# Patient Record
Sex: FEMALE | Race: WHITE | NOT HISPANIC OR LATINO | Employment: FULL TIME | URBAN - METROPOLITAN AREA
[De-identification: names, ages, dates, MRNs, and addresses within clinical notes are randomized per-mention and may not be internally consistent; named-entity substitution may affect disease eponyms.]

---

## 2023-03-10 LAB
THYROTROPIN (MIU/L) IN SER/PLAS BY DETECTION LIMIT <= 0.05 MIU/L: 2.77 MIU/L (ref 0.44–3.98)
THYROXINE (T4) FREE (NG/DL) IN SER/PLAS: 0.94 NG/DL (ref 0.61–1.12)

## 2023-04-17 PROBLEM — N18.9 CKD (CHRONIC KIDNEY DISEASE): Status: ACTIVE | Noted: 2023-04-17

## 2023-04-17 PROBLEM — D24.2 INTRADUCTAL PAPILLOMA OF LEFT BREAST: Status: ACTIVE | Noted: 2023-04-17

## 2023-04-17 PROBLEM — F41.9 ANXIETY: Status: ACTIVE | Noted: 2023-04-17

## 2023-04-17 PROBLEM — N63.20 BREAST MASS, LEFT: Status: ACTIVE | Noted: 2023-04-17

## 2023-04-17 PROBLEM — B00.9 HSV-1 (HERPES SIMPLEX VIRUS 1) INFECTION: Status: ACTIVE | Noted: 2023-04-17

## 2023-04-17 PROBLEM — N39.41 URGE INCONTINENCE OF URINE: Status: ACTIVE | Noted: 2023-04-17

## 2023-04-17 PROBLEM — R80.9 ASYMPTOMATIC PROTEINURIA: Status: ACTIVE | Noted: 2023-04-17

## 2023-04-17 PROBLEM — D36.9 INTRADUCTAL PAPILLOMA: Status: ACTIVE | Noted: 2023-04-17

## 2023-04-17 PROBLEM — J30.9 ALLERGIC RHINITIS: Status: ACTIVE | Noted: 2023-04-17

## 2023-04-17 PROBLEM — D17.1 LIPOMA OF BACK: Status: ACTIVE | Noted: 2023-04-17

## 2023-04-17 PROBLEM — J45.20 MILD INTERMITTENT EXTRINSIC ASTHMA (HHS-HCC): Status: ACTIVE | Noted: 2023-04-17

## 2023-04-17 PROBLEM — G89.29 CHRONIC PAIN: Status: ACTIVE | Noted: 2023-04-17

## 2023-04-17 PROBLEM — J45.990 EXERCISE-INDUCED BRONCHOSPASM (HHS-HCC): Status: ACTIVE | Noted: 2023-04-17

## 2023-04-17 PROBLEM — S05.00XA CONJUNCTIVAL ABRASION: Status: ACTIVE | Noted: 2023-04-17

## 2023-04-17 PROBLEM — R76.8 POSITIVE ANA (ANTINUCLEAR ANTIBODY): Status: ACTIVE | Noted: 2023-04-17

## 2023-04-17 PROBLEM — M19.90 OSTEOARTHRITIS: Status: ACTIVE | Noted: 2023-04-17

## 2023-04-17 PROBLEM — E55.9 VITAMIN D DEFICIENCY: Status: ACTIVE | Noted: 2023-04-17

## 2023-04-17 PROBLEM — L81.9 ATYPICAL PIGMENTED SKIN LESION: Status: ACTIVE | Noted: 2023-04-17

## 2023-04-17 PROBLEM — R79.89 ABNORMAL THYROID SCREEN (BLOOD): Status: ACTIVE | Noted: 2023-04-17

## 2023-04-17 PROBLEM — E03.9 HYPOTHYROIDISM: Status: ACTIVE | Noted: 2023-04-17

## 2023-04-17 PROBLEM — R92.8 ABNORMAL MAMMOGRAM: Status: ACTIVE | Noted: 2023-04-17

## 2023-04-17 PROBLEM — I49.3 ASYMPTOMATIC PVCS: Status: ACTIVE | Noted: 2023-04-17

## 2023-04-17 PROBLEM — E78.5 HYPERLIPIDEMIA: Status: ACTIVE | Noted: 2023-04-17

## 2023-04-17 PROBLEM — I10 ESSENTIAL HYPERTENSION: Status: ACTIVE | Noted: 2023-04-17

## 2023-04-17 PROBLEM — Z98.84 S/P BARIATRIC SURGERY: Status: ACTIVE | Noted: 2023-04-17

## 2023-04-17 PROBLEM — N64.52 NIPPLE DISCHARGE: Status: ACTIVE | Noted: 2023-04-17

## 2023-04-17 RX ORDER — ALBUTEROL SULFATE 90 UG/1
2 AEROSOL, METERED RESPIRATORY (INHALATION) EVERY 6 HOURS PRN
COMMUNITY
Start: 2020-04-10 | End: 2023-06-02 | Stop reason: ALTCHOICE

## 2023-04-17 RX ORDER — MONTELUKAST SODIUM 10 MG/1
1 TABLET ORAL DAILY
COMMUNITY
Start: 2020-04-10 | End: 2023-04-20 | Stop reason: SDUPTHER

## 2023-04-17 RX ORDER — SERTRALINE HYDROCHLORIDE 50 MG/1
1 TABLET, FILM COATED ORAL DAILY
COMMUNITY
Start: 2019-08-07 | End: 2023-04-20 | Stop reason: SDUPTHER

## 2023-04-17 RX ORDER — ACETAMINOPHEN 500 MG
1 TABLET ORAL DAILY
COMMUNITY

## 2023-04-17 RX ORDER — OXYCODONE AND ACETAMINOPHEN 5; 325 MG/1; MG/1
1 TABLET ORAL
COMMUNITY
Start: 2022-05-25 | End: 2023-06-02 | Stop reason: ALTCHOICE

## 2023-04-17 RX ORDER — LEVOTHYROXINE SODIUM 175 UG/1
1 TABLET ORAL DAILY
COMMUNITY
Start: 2020-04-10 | End: 2023-06-02 | Stop reason: ALTCHOICE

## 2023-04-17 RX ORDER — LISINOPRIL 10 MG/1
10 TABLET ORAL DAILY
COMMUNITY
Start: 2022-05-03 | End: 2023-06-02 | Stop reason: ALTCHOICE

## 2023-04-17 RX ORDER — ONDANSETRON 4 MG/1
8 TABLET, FILM COATED ORAL EVERY 8 HOURS PRN
COMMUNITY
Start: 2022-05-25 | End: 2023-06-02 | Stop reason: ALTCHOICE

## 2023-04-17 RX ORDER — VALACYCLOVIR HYDROCHLORIDE 1 G/1
1000 TABLET, FILM COATED ORAL DAILY
COMMUNITY
End: 2023-05-14

## 2023-04-17 RX ORDER — URSODIOL 300 MG/1
CAPSULE ORAL
COMMUNITY
End: 2023-06-02 | Stop reason: ALTCHOICE

## 2023-04-17 RX ORDER — OMEPRAZOLE 20 MG/1
20 CAPSULE, DELAYED RELEASE ORAL
COMMUNITY
End: 2023-06-02 | Stop reason: ALTCHOICE

## 2023-04-17 RX ORDER — ATORVASTATIN CALCIUM 20 MG/1
20 TABLET, FILM COATED ORAL
COMMUNITY
Start: 2022-05-03 | End: 2023-06-02 | Stop reason: ALTCHOICE

## 2023-04-17 RX ORDER — NYSTATIN 100000 [USP'U]/ML
SUSPENSION ORAL
COMMUNITY
Start: 2022-05-31 | End: 2023-06-02 | Stop reason: ALTCHOICE

## 2023-04-17 RX ORDER — ACETAMINOPHEN, DEXTROMETHORPHAN HBR, DOXYLAMINE SUCCINATE, PHENYLEPHRINE HCL 650; 20; 12.5; 1 MG/30ML; MG/30ML; MG/30ML; MG/30ML
1 SOLUTION ORAL DAILY
COMMUNITY

## 2023-04-20 DIAGNOSIS — J30.9 ALLERGIC RHINITIS, UNSPECIFIED SEASONALITY, UNSPECIFIED TRIGGER: Primary | ICD-10-CM

## 2023-04-20 DIAGNOSIS — F41.9 ANXIETY: ICD-10-CM

## 2023-04-20 NOTE — TELEPHONE ENCOUNTER
Pt's apt with [facility] was rescheduled this week. It is now on 6/2, but pt will run out of Montelukast and Sertraline 50mg AND 25mg before then and needs those to [facility] st[facility]?

## 2023-04-21 ENCOUNTER — APPOINTMENT (OUTPATIENT)
Dept: PRIMARY CARE | Facility: CLINIC | Age: 63
End: 2023-04-21
Payer: COMMERCIAL

## 2023-04-21 RX ORDER — SERTRALINE HYDROCHLORIDE 50 MG/1
50 TABLET, FILM COATED ORAL DAILY
Qty: 30 TABLET | Refills: 0 | Status: SHIPPED | OUTPATIENT
Start: 2023-04-21 | End: 2023-06-02 | Stop reason: SDUPTHER

## 2023-04-21 RX ORDER — MONTELUKAST SODIUM 10 MG/1
10 TABLET ORAL DAILY
Qty: 30 TABLET | Refills: 0 | Status: SHIPPED | OUTPATIENT
Start: 2023-04-21 | End: 2023-06-02 | Stop reason: SDUPTHER

## 2023-05-11 DIAGNOSIS — B00.9 HSV-1 (HERPES SIMPLEX VIRUS 1) INFECTION: Primary | ICD-10-CM

## 2023-05-12 LAB
THYROTROPIN (MIU/L) IN SER/PLAS BY DETECTION LIMIT <= 0.05 MIU/L: 0.13 MIU/L (ref 0.44–3.98)
THYROXINE (T4) FREE (NG/DL) IN SER/PLAS: 1.29 NG/DL (ref 0.61–1.12)

## 2023-05-14 RX ORDER — VALACYCLOVIR HYDROCHLORIDE 1 G/1
TABLET, FILM COATED ORAL
Qty: 30 TABLET | Refills: 0 | Status: SHIPPED | OUTPATIENT
Start: 2023-05-14

## 2023-06-02 ENCOUNTER — OFFICE VISIT (OUTPATIENT)
Dept: PRIMARY CARE | Facility: CLINIC | Age: 63
End: 2023-06-02
Payer: COMMERCIAL

## 2023-06-02 VITALS
TEMPERATURE: 96.8 F | HEIGHT: 64 IN | HEART RATE: 70 BPM | DIASTOLIC BLOOD PRESSURE: 52 MMHG | WEIGHT: 150 LBS | SYSTOLIC BLOOD PRESSURE: 109 MMHG | OXYGEN SATURATION: 95 % | BODY MASS INDEX: 25.61 KG/M2

## 2023-06-02 DIAGNOSIS — Z00.00 HEALTHCARE MAINTENANCE: ICD-10-CM

## 2023-06-02 DIAGNOSIS — J30.9 ALLERGIC RHINITIS, UNSPECIFIED SEASONALITY, UNSPECIFIED TRIGGER: ICD-10-CM

## 2023-06-02 DIAGNOSIS — F41.9 ANXIETY: ICD-10-CM

## 2023-06-02 DIAGNOSIS — Z12.11 COLON CANCER SCREENING: ICD-10-CM

## 2023-06-02 DIAGNOSIS — Z78.0 POST-MENOPAUSAL: Primary | ICD-10-CM

## 2023-06-02 PROBLEM — R92.8 ABNORMAL MAMMOGRAM: Status: RESOLVED | Noted: 2023-04-17 | Resolved: 2023-06-02

## 2023-06-02 PROBLEM — N63.20 BREAST MASS, LEFT: Status: RESOLVED | Noted: 2023-04-17 | Resolved: 2023-06-02

## 2023-06-02 PROBLEM — D36.9 INTRADUCTAL PAPILLOMA: Status: RESOLVED | Noted: 2023-04-17 | Resolved: 2023-06-02

## 2023-06-02 PROBLEM — L81.9 ATYPICAL PIGMENTED SKIN LESION: Status: RESOLVED | Noted: 2023-04-17 | Resolved: 2023-06-02

## 2023-06-02 PROCEDURE — 3078F DIAST BP <80 MM HG: CPT

## 2023-06-02 PROCEDURE — 99396 PREV VISIT EST AGE 40-64: CPT

## 2023-06-02 PROCEDURE — 3074F SYST BP LT 130 MM HG: CPT

## 2023-06-02 PROCEDURE — 1036F TOBACCO NON-USER: CPT

## 2023-06-02 RX ORDER — LACTOBACILLUS COMBINATION NO.4 3B CELL
CAPSULE ORAL
COMMUNITY

## 2023-06-02 RX ORDER — IBUPROFEN 200 MG
500 CAPSULE ORAL
COMMUNITY

## 2023-06-02 RX ORDER — MONTELUKAST SODIUM 10 MG/1
10 TABLET ORAL DAILY
Qty: 90 TABLET | Refills: 3 | Status: SHIPPED | OUTPATIENT
Start: 2023-06-02 | End: 2024-06-07 | Stop reason: SDUPTHER

## 2023-06-02 RX ORDER — SERTRALINE HYDROCHLORIDE 50 MG/1
50 TABLET, FILM COATED ORAL DAILY
Qty: 90 TABLET | Refills: 3 | Status: SHIPPED | OUTPATIENT
Start: 2023-06-02 | End: 2023-12-18 | Stop reason: SDUPTHER

## 2023-06-02 RX ORDER — LEVOTHYROXINE SODIUM 150 UG/1
150 TABLET ORAL
COMMUNITY

## 2023-06-02 ASSESSMENT — ENCOUNTER SYMPTOMS
HEMATOLOGIC/LYMPHATIC NEGATIVE: 1
CARDIOVASCULAR NEGATIVE: 1
ENDOCRINE NEGATIVE: 1
PSYCHIATRIC NEGATIVE: 1
MUSCULOSKELETAL NEGATIVE: 1
CONSTITUTIONAL NEGATIVE: 1
GASTROINTESTINAL NEGATIVE: 1
EYES NEGATIVE: 1
NEUROLOGICAL NEGATIVE: 1
RESPIRATORY NEGATIVE: 1

## 2023-06-02 ASSESSMENT — PAIN SCALES - GENERAL: PAINLEVEL: 0-NO PAIN

## 2023-06-02 NOTE — PROGRESS NOTES
Subjective   Patient ID: Marielena Vides is a 62 y.o. female who presents for visit to [facility] care.      Diet: Healthy diet , eating a little bit of fruit and veggies before grains, 5-6 small meals through the day, mostly protein and veggies and fruit. No soda. Drinks coffee, water, tea  Exercise: Very active, routinely getting 02037+ steps per day  Weight: Down 80lbs planned  Water: Drinking about 64+oz fluid per day  Sleep: OK sleep, getting about 7-9 hours per night  Social: , her oldest son and 14 year old grandson with autism live with her in a raised ranch, 2 dogs, 3 chickens  Professional: Works as  at special needs     Review of Systems   Constitutional: Negative.    HENT: Negative.     Eyes: Negative.    Respiratory: Negative.     Cardiovascular: Negative.    Gastrointestinal: Negative.    Endocrine: Negative.    Genitourinary: Negative.    Musculoskeletal: Negative.    Skin: Negative.    Neurological: Negative.    Hematological: Negative.    Psychiatric/Behavioral: Negative.          Current Outpatient Medications   Medication Sig Dispense Refill    calcium citrate (Calcitrate) 200 mg (950 mg) tablet Take 2.5 tablets (500 mg) by mouth once daily.      cholecalciferol (Vitamin D-3) 5,000 Units tablet Take 1 tablet (5,000 Units) by mouth once daily.      cyanocobalamin, vitamin B-12, (Vitamin B-12) 1,000 mcg tablet extended release Take 1,000 mcg by mouth once daily.      lactobacillus combination no.4 (Probiotic) 3 billion cell capsule as directed      levothyroxine (Synthroid, Levoxyl) 150 mcg tablet Take 1 tablet (150 mcg) by mouth once daily in the morning. Take before meals.      multivitamin tablet,chewable Chew 2 tablets once daily. Flintstones vitamin with iron      valACYclovir (Valtrex) 1 gram tablet TAKE AS DIRECTED  IF NEEDED. 30 tablet 0    montelukast (Singulair) 10 mg tablet Take 1 tablet (10 mg) by mouth once daily. 90 tablet 3    sertraline (Zoloft) 50 mg  "tablet Take 1 tablet (50 mg) by mouth once daily. 90 tablet 3     No current facility-administered medications for this visit.     Past Surgical History:   Procedure Laterality Date    OTHER SURGICAL HISTORY  2021    Appendectomy    OTHER SURGICAL HISTORY  2021    Cyst excision    OTHER SURGICAL HISTORY  2021    Tonsillectomy    OTHER SURGICAL HISTORY  2021    Oral surgery    OTHER SURGICAL HISTORY  2021     section    OTHER SURGICAL HISTORY  2021    Carpal tunnel surgery    OTHER SURGICAL HISTORY  2021    Ovarian cystectomy    OTHER SURGICAL HISTORY  2021    Elbow surgery    OTHER SURGICAL HISTORY  2022    Gastric bypass surgery     No family history on file.   Social History     Tobacco Use    Smoking status: Never    Smokeless tobacco: Never   Vaping Use    Vaping status: Never Used   Substance Use Topics    Alcohol use: Never    Drug use: Never        Objective     Visit Vitals  /52 (BP Location: Left arm, Patient Position: Sitting, BP Cuff Size: Small adult)   Pulse 70   Temp 36 °C (96.8 °F)   Ht 1.618 m (5' 3.7\")   Wt 68 kg (150 lb)   SpO2 95%   BMI 25.99 kg/m²   Smoking Status Never   BSA 1.75 m²        Physical Exam  Constitutional:       Appearance: Normal appearance.   HENT:      Head: Normocephalic and atraumatic.   Eyes:      Extraocular Movements: Extraocular movements intact.      Pupils: Pupils are equal, round, and reactive to light.   Cardiovascular:      Rate and Rhythm: Normal rate and regular rhythm.   Pulmonary:      Effort: Pulmonary effort is normal.      Breath sounds: Normal breath sounds.   Abdominal:      General: Abdomen is flat. Bowel sounds are normal.      Palpations: Abdomen is soft.   Musculoskeletal:         General: Normal range of motion.   Neurological:      General: No focal deficit present.      Mental Status: She is alert and oriented to person, place, and time.   Psychiatric:         Mood and Affect: Mood " normal.         Behavior: Behavior normal.           Assessment/Plan   Problem List Items Addressed This Visit       Allergic rhinitis    Relevant Medications    montelukast (Singulair) 10 mg tablet    Anxiety    Relevant Medications    sertraline (Zoloft) 50 mg tablet     Other Visit Diagnoses       Post-menopausal    -  Primary    Relevant Orders    XR DEXA bone density    Colon cancer screening        Relevant Orders    Colonoscopy    Healthcare maintenance        Relevant Orders    Follow Up In Primary Care            All pertinent lab work and results were reviewed with patient.     Follow up with me 6-12 months    Vivian Cronin, CHINO-CNS

## 2023-06-02 NOTE — PATIENT INSTRUCTIONS
Thank you for coming to see me today.  If you have any questions or concerns following our visit, please contact the office.  Phone: [phone]    Follow up with me in 6-12 months or sooner as needed.   If seeing bariatric surgery in 6 months come see me in 12 months; if you see them in 12 months come see me in 6 months with labs before visit    1) Please schedule a bone density scan and colonoscopy - please call [phone] or stop to 's office (in the lab office) on your way out today.     2) Look into shingrix vaccine from local pharmacy; 2 shots given 2-6 months apart, check with pharmacy

## 2023-07-10 LAB
THYROTROPIN (MIU/L) IN SER/PLAS BY DETECTION LIMIT <= 0.05 MIU/L: 0.15 MIU/L (ref 0.44–3.98)
THYROXINE (T4) FREE (NG/DL) IN SER/PLAS: 1.31 NG/DL (ref 0.61–1.12)

## 2023-09-08 LAB — THYROTROPIN (MIU/L) IN SER/PLAS BY DETECTION LIMIT <= 0.05 MIU/L: 0.51 MIU/L (ref 0.44–3.98)

## 2023-11-03 ENCOUNTER — OFFICE VISIT (OUTPATIENT)
Dept: DERMATOLOGY | Facility: CLINIC | Age: 63
End: 2023-11-03
Payer: COMMERCIAL

## 2023-11-03 DIAGNOSIS — L82.1 SEBORRHEIC KERATOSIS: ICD-10-CM

## 2023-11-03 DIAGNOSIS — L91.8 SKIN TAG: ICD-10-CM

## 2023-11-03 DIAGNOSIS — Z12.83 ENCOUNTER FOR SCREENING FOR MALIGNANT NEOPLASM OF SKIN: Primary | ICD-10-CM

## 2023-11-03 DIAGNOSIS — L81.4 LENTIGO: ICD-10-CM

## 2023-11-03 DIAGNOSIS — D18.01 HEMANGIOMA OF SKIN: ICD-10-CM

## 2023-11-03 PROCEDURE — 99213 OFFICE O/P EST LOW 20 MIN: CPT | Performed by: NURSE PRACTITIONER

## 2023-11-03 PROCEDURE — 3074F SYST BP LT 130 MM HG: CPT | Performed by: NURSE PRACTITIONER

## 2023-11-03 PROCEDURE — 3078F DIAST BP <80 MM HG: CPT | Performed by: NURSE PRACTITIONER

## 2023-11-03 PROCEDURE — 1036F TOBACCO NON-USER: CPT | Performed by: NURSE PRACTITIONER

## 2023-11-03 NOTE — PROGRESS NOTES
Subjective     Marielena Vides is a 63 y.o. female who presents for the following: Skin Check (Pt requests full body skin exam. No complaints.).     Review of Systems:  No other skin or systemic complaints other than what is documented elsewhere in the note.    The following portions of the chart were reviewed this encounter and updated as appropriate:  Tobacco  Allergies  Meds  Problems  Med Hx  Surg Hx  Fam Hx         Skin Cancer History  No skin cancer on file.      Specialty Problems    None       Objective   Well appearing patient in no apparent distress; mood and affect are within normal limits.    A full examination was performed including scalp, head, eyes, ears, nose, lips, neck, chest, axillae, abdomen, back, buttocks, bilateral upper extremities, bilateral lower extremities, hands, feet, fingers, toes, fingernails, and toenails. All findings within normal limits unless otherwise noted below.    Assessment/Plan   1. Encounter for screening for malignant neoplasm of skin  Scattered benign lesions    - Protective measures, such as avoiding skin exposure to sunlight during peak sun hours (10 AM to 3 PM), wearing protective clothing, and applying high-SPF sunscreen, are essential for reducing exposure to harmful ultraviolet (UV) light.  - Monthly self-examination of the skin is helpful to detect new lesions or changes in existing lesions.  - Discussed signs and symptoms of sun-related skin cancers.   - Make sure your moles are not signs of skin cancer (melanoma). Remember the ABCDEs of melanoma lesions:  A - Asymmetry: One half of the lesion does not mirror the other half.  B - Border: The borders are irregular or vague (indistinct).  C - Color: More than one color may be noted within the mole.  D - Diameter: Size greater than 6 mm (roughly the size of a pencil eraser) may be concerning.  E - Evolving: Notable changes in the lesion over time are suspicious signs for skin cancer.    Related  Procedures  Follow Up In Dermatology - Established Patient    2. Seborrheic keratosis  Stuck on verrucous, tan-brown papules and plaques.      Although Seborrheic Keratoses can be troublesome and unsightly, they are entirely benign.  Removal of Seborrheic Keratoses is considered a cosmetic procedure. Removal is typically performed using liquid nitrogen cryotherapy.  Treatment of current lesions does not prevent the development of new Seborrheic Keratoses in the future.    3. Skin tag  Fleshy, skin-colored sessile and pedunculated papules.     Acrochordon (skin tag)  - The benign nature of the lesion was discussed with patient.   - A skin tag (acrochordon) is a common, possibly inherited condition that manifests as small, flesh-colored growths on a thin stalk. Skin tags are benign lesions that can sometimes become irritated or traumatized.  - Skin tags are very common, and their incidence increases with age. Seen more often in people with growth hormone excess (acromegaly).   - Skin tags are most commonly found on the eyelids, neck, armpits, and groin area. They are flesh-colored growths on a thin stalk, ranging in size from small to large.      4. Hemangioma of skin  Violaceous/red papule with maroon lagoons     - A cherry hemangioma is a small macule (small, flat, smooth area) or papule (small, solid bump) formed from an overgrowth of tiny blood vessels in the skin. Cherry hemangiomas are characteristically red or purplish in color. They often first appear in middle adulthood and usually increase in number with age. Cherry hemangiomas are noncancerous (benign) and are common in adults.  - Lesions are benign, reassured patient.     5. Lentigo  Scattered tan macules in sun-exposed areas.    A solar lentigo (plural, solar lentigines), sometimes called an age spot or liver spot, is a brown macule (small, flat, smooth area of skin) caused by chronic sun or artificial ultraviolet (UV) light exposure. There may be just  one lentigo or there may be multiple. This type of lentigo is different from lentigo simplex (discussed separately) because it is caused by exposure to UV light. Solar lentigines are benign, but they do indicate excessive sun exposure, a risk factor for the development of skin cancer.  Lesions are benign, no treatment needed.

## 2023-12-14 ENCOUNTER — PATIENT MESSAGE (OUTPATIENT)
Dept: PRIMARY CARE | Facility: CLINIC | Age: 63
End: 2023-12-14
Payer: COMMERCIAL

## 2023-12-14 DIAGNOSIS — F41.9 ANXIETY: ICD-10-CM

## 2023-12-18 RX ORDER — SERTRALINE HYDROCHLORIDE 50 MG/1
50 TABLET, FILM COATED ORAL DAILY
Qty: 90 TABLET | Refills: 3 | Status: SHIPPED | OUTPATIENT
Start: 2023-12-18 | End: 2024-06-07 | Stop reason: SDUPTHER

## 2023-12-18 RX ORDER — SERTRALINE HYDROCHLORIDE 25 MG/1
25 TABLET, FILM COATED ORAL DAILY
Qty: 90 TABLET | Refills: 3 | Status: SHIPPED | OUTPATIENT
Start: 2023-12-18 | End: 2024-06-07 | Stop reason: SDUPTHER

## 2024-01-31 ENCOUNTER — PATIENT MESSAGE (OUTPATIENT)
Dept: PRIMARY CARE | Facility: CLINIC | Age: 64
End: 2024-01-31
Payer: COMMERCIAL

## 2024-01-31 DIAGNOSIS — N39.41 URGE INCONTINENCE OF URINE: Primary | ICD-10-CM

## 2024-02-01 RX ORDER — MIRABEGRON 25 MG/1
25 TABLET, FILM COATED, EXTENDED RELEASE ORAL DAILY
Qty: 30 TABLET | Refills: 11 | Status: SHIPPED | OUTPATIENT
Start: 2024-02-01 | End: 2024-06-07 | Stop reason: SDUPTHER

## 2024-02-29 ENCOUNTER — OFFICE VISIT (OUTPATIENT)
Dept: OBSTETRICS AND GYNECOLOGY | Facility: CLINIC | Age: 64
End: 2024-02-29
Payer: COMMERCIAL

## 2024-02-29 VITALS
BODY MASS INDEX: 25.61 KG/M2 | DIASTOLIC BLOOD PRESSURE: 60 MMHG | HEIGHT: 64 IN | WEIGHT: 150 LBS | SYSTOLIC BLOOD PRESSURE: 118 MMHG

## 2024-02-29 DIAGNOSIS — Z11.51 SCREENING FOR HUMAN PAPILLOMAVIRUS (HPV): ICD-10-CM

## 2024-02-29 DIAGNOSIS — Z12.4 PAP SMEAR FOR CERVICAL CANCER SCREENING: ICD-10-CM

## 2024-02-29 DIAGNOSIS — Z01.419 WOMEN'S ANNUAL ROUTINE GYNECOLOGICAL EXAMINATION: Primary | ICD-10-CM

## 2024-02-29 PROCEDURE — 3074F SYST BP LT 130 MM HG: CPT | Performed by: NURSE PRACTITIONER

## 2024-02-29 PROCEDURE — 3078F DIAST BP <80 MM HG: CPT | Performed by: NURSE PRACTITIONER

## 2024-02-29 PROCEDURE — 88175 CYTOPATH C/V AUTO FLUID REDO: CPT

## 2024-02-29 PROCEDURE — 87624 HPV HI-RISK TYP POOLED RSLT: CPT

## 2024-02-29 PROCEDURE — 1036F TOBACCO NON-USER: CPT | Performed by: NURSE PRACTITIONER

## 2024-02-29 PROCEDURE — 99396 PREV VISIT EST AGE 40-64: CPT | Performed by: NURSE PRACTITIONER

## 2024-02-29 ASSESSMENT — ENCOUNTER SYMPTOMS
NEUROLOGICAL NEGATIVE: 1
PSYCHIATRIC NEGATIVE: 1
EYES NEGATIVE: 1
CONSTITUTIONAL NEGATIVE: 1
RESPIRATORY NEGATIVE: 1
MUSCULOSKELETAL NEGATIVE: 1
ENDOCRINE NEGATIVE: 1
GASTROINTESTINAL NEGATIVE: 1
CARDIOVASCULAR NEGATIVE: 1

## 2024-02-29 NOTE — PROGRESS NOTES
Subjective   Patient ID: Marielena Vides is a 63 y.o. female who presents for Annual Exam (Normal PAP /HPV 12/09/2021/Bone Density 6/2/2023. /Mammogram done 4/20/2023).  63 year old here for annual exam without complaints.  She is due for her pap today as her las tpap was neg with neg hpv in 2021.  She is due for her mammogram in April 2024.  She denies any bleeding, pain, discharge, urinary changes and breast complaints.         Review of Systems   Constitutional: Negative.    HENT: Negative.     Eyes: Negative.    Respiratory: Negative.     Cardiovascular: Negative.    Gastrointestinal: Negative.    Endocrine: Negative.    Genitourinary: Negative.    Musculoskeletal: Negative.    Skin: Negative.    Neurological: Negative.    Psychiatric/Behavioral: Negative.         Objective   Physical Exam  Vitals reviewed.   Constitutional:       Appearance: Normal appearance. She is well-developed.   Pulmonary:      Effort: Pulmonary effort is normal. No respiratory distress.   Chest:   Breasts:     Breasts are symmetrical.      Right: Normal. No swelling, bleeding, inverted nipple, mass, nipple discharge, skin change or tenderness.      Left: Normal. No swelling, bleeding, inverted nipple, mass, nipple discharge, skin change or tenderness.   Abdominal:      Palpations: Abdomen is soft.   Genitourinary:     General: Normal vulva.      Exam position: Lithotomy position.      Pubic Area: No rash.       Labia:         Right: No rash, tenderness, lesion or injury.         Left: No rash, tenderness, lesion or injury.       Urethra: No prolapse, urethral pain, urethral swelling or urethral lesion.      Vagina: Normal.      Cervix: Normal.      Uterus: Normal. With uterine prolapse.       Adnexa: Right adnexa normal and left adnexa normal.      Rectum: Normal.   Musculoskeletal:         General: Normal range of motion.   Lymphadenopathy:      Upper Body:      Right upper body: No supraclavicular, axillary or pectoral adenopathy.       Left upper body: No supraclavicular, axillary or pectoral adenopathy.   Skin:     General: Skin is warm and dry.   Neurological:      General: No focal deficit present.      Mental Status: She is alert and oriented to person, place, and time. Mental status is at baseline.   Psychiatric:         Attention and Perception: Attention and perception normal.         Mood and Affect: Mood and affect normal.         Speech: Speech normal.         Behavior: Behavior normal. Behavior is cooperative.         Thought Content: Thought content normal.         Judgment: Judgment normal.         Assessment/Plan   Problem List Items Addressed This Visit             ICD-10-CM    Women's annual routine gynecological examination - Primary Z01.419     Other Visit Diagnoses         Codes    Pap smear for cervical cancer screening     Z12.4    Relevant Orders    THINPREP PAP TEST    Screening for human papillomavirus (HPV)     Z11.51    Relevant Orders    THINPREP PAP TEST          Pap/hpv sent  Mammogram scheduled  Follow up 1 year or as needed       CHINO Ann-CNP 02/29/24 9:23 AM

## 2024-03-12 ENCOUNTER — HOSPITAL ENCOUNTER (OUTPATIENT)
Dept: RADIOLOGY | Facility: EXTERNAL LOCATION | Age: 64
Discharge: HOME | End: 2024-03-12
Payer: COMMERCIAL

## 2024-03-12 DIAGNOSIS — M79.671 PAIN IN RIGHT FOOT: ICD-10-CM

## 2024-03-12 LAB
CYTOLOGY CMNT CVX/VAG CYTO-IMP: NORMAL
HPV HR 12 DNA GENITAL QL NAA+PROBE: NEGATIVE
HPV HR GENOTYPES PNL CVX NAA+PROBE: NEGATIVE
HPV16 DNA SPEC QL NAA+PROBE: NEGATIVE
HPV18 DNA SPEC QL NAA+PROBE: NEGATIVE
LAB AP HPV GENOTYPE QUESTION: YES
LAB AP HPV HR: NORMAL
LABORATORY COMMENT REPORT: NORMAL
PATH REPORT.TOTAL CANCER: NORMAL

## 2024-03-28 ENCOUNTER — PATIENT MESSAGE (OUTPATIENT)
Dept: ENDOCRINOLOGY | Facility: CLINIC | Age: 64
End: 2024-03-28
Payer: COMMERCIAL

## 2024-03-28 DIAGNOSIS — R79.89 ABNORMAL THYROID SCREEN (BLOOD): Primary | ICD-10-CM

## 2024-03-29 NOTE — TELEPHONE ENCOUNTER
From: Marielena Vides  To: Kimmie Kramer, APRN-CNP  Sent: 3/28/2024 8:10 PM EDT  Subject: Bloodwork    Hello, I thought that we had agreed to a check of my TSH levels about now. However, there are no orders in the  systems for the bloodwork.     Do you want to monitor my thyroid levels before the next appt. In Sept.?    Sincerely,   Marielena Vides

## 2024-04-01 ENCOUNTER — LAB (OUTPATIENT)
Dept: LAB | Facility: LAB | Age: 64
End: 2024-04-01
Payer: COMMERCIAL

## 2024-04-01 DIAGNOSIS — R79.89 ABNORMAL THYROID SCREEN (BLOOD): ICD-10-CM

## 2024-04-01 LAB — TSH SERPL-ACNC: 2.13 MIU/L (ref 0.44–3.98)

## 2024-04-01 PROCEDURE — 84443 ASSAY THYROID STIM HORMONE: CPT

## 2024-04-01 PROCEDURE — 36415 COLL VENOUS BLD VENIPUNCTURE: CPT

## 2024-04-02 DIAGNOSIS — R79.89 ABNORMAL THYROID SCREEN (BLOOD): Primary | ICD-10-CM

## 2024-04-02 DIAGNOSIS — E03.9 HYPOTHYROIDISM, UNSPECIFIED TYPE: ICD-10-CM

## 2024-04-26 ENCOUNTER — HOSPITAL ENCOUNTER (OUTPATIENT)
Dept: RADIOLOGY | Facility: CLINIC | Age: 64
Discharge: HOME | End: 2024-04-26
Payer: COMMERCIAL

## 2024-04-26 VITALS — BODY MASS INDEX: 25.61 KG/M2 | WEIGHT: 150 LBS | HEIGHT: 64 IN

## 2024-04-26 DIAGNOSIS — Z00.00 ENCOUNTER FOR GENERAL ADULT MEDICAL EXAMINATION WITHOUT ABNORMAL FINDINGS: ICD-10-CM

## 2024-04-26 PROCEDURE — 77067 SCR MAMMO BI INCL CAD: CPT | Mod: BILATERAL PROCEDURE | Performed by: RADIOLOGY

## 2024-04-26 PROCEDURE — 77067 SCR MAMMO BI INCL CAD: CPT

## 2024-04-26 PROCEDURE — 77063 BREAST TOMOSYNTHESIS BI: CPT | Mod: BILATERAL PROCEDURE | Performed by: RADIOLOGY

## 2024-06-07 ENCOUNTER — OFFICE VISIT (OUTPATIENT)
Dept: PRIMARY CARE | Facility: CLINIC | Age: 64
End: 2024-06-07
Payer: COMMERCIAL

## 2024-06-07 VITALS
DIASTOLIC BLOOD PRESSURE: 70 MMHG | SYSTOLIC BLOOD PRESSURE: 111 MMHG | RESPIRATION RATE: 18 BRPM | TEMPERATURE: 96.8 F | WEIGHT: 154 LBS | OXYGEN SATURATION: 99 % | BODY MASS INDEX: 26.29 KG/M2 | HEIGHT: 64 IN | HEART RATE: 74 BPM

## 2024-06-07 DIAGNOSIS — N39.41 URGE INCONTINENCE OF URINE: ICD-10-CM

## 2024-06-07 DIAGNOSIS — J30.9 ALLERGIC RHINITIS, UNSPECIFIED SEASONALITY, UNSPECIFIED TRIGGER: ICD-10-CM

## 2024-06-07 DIAGNOSIS — F41.9 ANXIETY: ICD-10-CM

## 2024-06-07 DIAGNOSIS — Z00.00 HEALTHCARE MAINTENANCE: ICD-10-CM

## 2024-06-07 DIAGNOSIS — Z23 NEED FOR PNEUMOCOCCAL VACCINATION: Primary | ICD-10-CM

## 2024-06-07 PROBLEM — J45.909 ASTHMA (HHS-HCC): Status: ACTIVE | Noted: 2023-04-17

## 2024-06-07 PROBLEM — N25.81 HYPERPARATHYROIDISM DUE TO RENAL INSUFFICIENCY (MULTI): Status: ACTIVE | Noted: 2024-06-07

## 2024-06-07 PROBLEM — E66.01 MORBID OBESITY DUE TO EXCESS CALORIES (MULTI): Status: ACTIVE | Noted: 2021-04-20

## 2024-06-07 PROBLEM — E66.01 MORBID OBESITY DUE TO EXCESS CALORIES (MULTI): Status: RESOLVED | Noted: 2021-04-20 | Resolved: 2024-06-07

## 2024-06-07 PROBLEM — D22.5 MELANOCYTIC NEVI OF TRUNK: Status: ACTIVE | Noted: 2023-04-07

## 2024-06-07 PROBLEM — D22.60 MELANOCYTIC NEVI OF UNSPECIFIED UPPER LIMB, INCLUDING SHOULDER: Status: ACTIVE | Noted: 2022-11-04

## 2024-06-07 PROBLEM — M17.0 BILATERAL PRIMARY OSTEOARTHRITIS OF KNEE: Status: ACTIVE | Noted: 2020-04-06

## 2024-06-07 PROBLEM — R53.83 FATIGUE: Status: ACTIVE | Noted: 2024-06-07

## 2024-06-07 PROBLEM — E66.9 OBESITY: Status: ACTIVE | Noted: 2024-06-07

## 2024-06-07 PROBLEM — Z98.84 S/P BARIATRIC SURGERY: Status: RESOLVED | Noted: 2023-04-17 | Resolved: 2024-06-07

## 2024-06-07 PROBLEM — D22.70 MELANOCYTIC NEVI OF UNSPECIFIED LOWER LIMB, INCLUDING HIP: Status: ACTIVE | Noted: 2023-04-07

## 2024-06-07 PROBLEM — E66.9 OBESITY: Status: RESOLVED | Noted: 2024-06-07 | Resolved: 2024-06-07

## 2024-06-07 PROBLEM — L24.9 IRRITANT CONTACT DERMATITIS, UNSPECIFIED CAUSE: Status: ACTIVE | Noted: 2021-10-08

## 2024-06-07 PROBLEM — E66.9 OBESITY WITH BODY MASS INDEX 30 OR GREATER: Status: ACTIVE | Noted: 2024-06-07

## 2024-06-07 PROBLEM — K44.9 HIATAL HERNIA: Status: ACTIVE | Noted: 2021-06-17

## 2024-06-07 PROBLEM — D18.01 HEMANGIOMA OF SKIN AND SUBCUTANEOUS TISSUE: Status: ACTIVE | Noted: 2023-04-07

## 2024-06-07 PROBLEM — E66.9 OBESITY WITH BODY MASS INDEX 30 OR GREATER: Status: RESOLVED | Noted: 2024-06-07 | Resolved: 2024-06-07

## 2024-06-07 PROCEDURE — 3074F SYST BP LT 130 MM HG: CPT

## 2024-06-07 PROCEDURE — 90471 IMMUNIZATION ADMIN: CPT

## 2024-06-07 PROCEDURE — 99213 OFFICE O/P EST LOW 20 MIN: CPT

## 2024-06-07 PROCEDURE — 99396 PREV VISIT EST AGE 40-64: CPT

## 2024-06-07 PROCEDURE — 3078F DIAST BP <80 MM HG: CPT

## 2024-06-07 PROCEDURE — 90677 PCV20 VACCINE IM: CPT

## 2024-06-07 RX ORDER — MONTELUKAST SODIUM 10 MG/1
10 TABLET ORAL DAILY
Qty: 90 TABLET | Refills: 3 | Status: SHIPPED | OUTPATIENT
Start: 2024-06-07

## 2024-06-07 RX ORDER — LEVOTHYROXINE SODIUM 125 UG/1
125 TABLET ORAL DAILY
COMMUNITY
Start: 2024-04-08

## 2024-06-07 RX ORDER — MIRABEGRON 25 MG/1
25 TABLET, FILM COATED, EXTENDED RELEASE ORAL DAILY
Qty: 90 TABLET | Refills: 3 | Status: SHIPPED | OUTPATIENT
Start: 2024-06-07

## 2024-06-07 RX ORDER — SERTRALINE HYDROCHLORIDE 50 MG/1
50 TABLET, FILM COATED ORAL DAILY
Qty: 90 TABLET | Refills: 3 | Status: SHIPPED | OUTPATIENT
Start: 2024-06-07

## 2024-06-07 RX ORDER — SERTRALINE HYDROCHLORIDE 25 MG/1
25 TABLET, FILM COATED ORAL DAILY
Qty: 90 TABLET | Refills: 3 | Status: SHIPPED | OUTPATIENT
Start: 2024-06-07

## 2024-06-07 SDOH — ECONOMIC STABILITY: FOOD INSECURITY: WITHIN THE PAST 12 MONTHS, YOU WORRIED THAT YOUR FOOD WOULD RUN OUT BEFORE YOU GOT MONEY TO BUY MORE.: NEVER TRUE

## 2024-06-07 SDOH — ECONOMIC STABILITY: FOOD INSECURITY: WITHIN THE PAST 12 MONTHS, THE FOOD YOU BOUGHT JUST DIDN'T LAST AND YOU DIDN'T HAVE MONEY TO GET MORE.: NEVER TRUE

## 2024-06-07 ASSESSMENT — PATIENT HEALTH QUESTIONNAIRE - PHQ9
1. LITTLE INTEREST OR PLEASURE IN DOING THINGS: NOT AT ALL
SUM OF ALL RESPONSES TO PHQ9 QUESTIONS 1 & 2: 0
2. FEELING DOWN, DEPRESSED OR HOPELESS: NOT AT ALL

## 2024-06-07 ASSESSMENT — ENCOUNTER SYMPTOMS
PSYCHIATRIC NEGATIVE: 1
HEMATOLOGIC/LYMPHATIC NEGATIVE: 1
GASTROINTESTINAL NEGATIVE: 1
CARDIOVASCULAR NEGATIVE: 1
LOSS OF SENSATION IN FEET: 0
ENDOCRINE NEGATIVE: 1
RESPIRATORY NEGATIVE: 1
DEPRESSION: 0
CONSTITUTIONAL NEGATIVE: 1
NEUROLOGICAL NEGATIVE: 1
OCCASIONAL FEELINGS OF UNSTEADINESS: 0
EYES NEGATIVE: 1
MUSCULOSKELETAL NEGATIVE: 1

## 2024-06-07 ASSESSMENT — ANXIETY QUESTIONNAIRES
4. TROUBLE RELAXING: NOT AT ALL
IF YOU CHECKED OFF ANY PROBLEMS ON THIS QUESTIONNAIRE, HOW DIFFICULT HAVE THESE PROBLEMS MADE IT FOR YOU TO DO YOUR WORK, TAKE CARE OF THINGS AT HOME, OR GET ALONG WITH OTHER PEOPLE: NOT DIFFICULT AT ALL
1. FEELING NERVOUS, ANXIOUS, OR ON EDGE: NOT AT ALL
3. WORRYING TOO MUCH ABOUT DIFFERENT THINGS: NOT AT ALL
6. BECOMING EASILY ANNOYED OR IRRITABLE: NOT AT ALL
GAD7 TOTAL SCORE: 0
7. FEELING AFRAID AS IF SOMETHING AWFUL MIGHT HAPPEN: NOT AT ALL
5. BEING SO RESTLESS THAT IT IS HARD TO SIT STILL: NOT AT ALL
2. NOT BEING ABLE TO STOP OR CONTROL WORRYING: NOT AT ALL

## 2024-06-07 ASSESSMENT — PAIN SCALES - GENERAL: PAINLEVEL: 0-NO PAIN

## 2024-06-07 ASSESSMENT — LIFESTYLE VARIABLES
SKIP TO QUESTIONS 9-10: 1
HOW OFTEN DO YOU HAVE SIX OR MORE DRINKS ON ONE OCCASION: NEVER
AUDIT-C TOTAL SCORE: 0
HOW MANY STANDARD DRINKS CONTAINING ALCOHOL DO YOU HAVE ON A TYPICAL DAY: PATIENT DOES NOT DRINK
HOW OFTEN DO YOU HAVE A DRINK CONTAINING ALCOHOL: NEVER

## 2024-06-07 NOTE — PROGRESS NOTES
Subjective   Patient ID: Marielena Vides is a 63 y.o. female who presents for CPE and follow up of anxiety/depression.    Doing well, has no concerns or complaints today.    Diet: Healthy diet , eating a little bit of fruit and veggies before grains, 5-6 small meals through the day, mostly protein and veggies and fruit. No soda. Drinks coffee, water, tea  Exercise: Very active, routinely getting 28299+ steps per day  Weight: Down 80lbs planned  Water: Drinking about 64+oz fluid per day  Sleep: OK sleep, getting about 7-9 hours per night  Social: , her oldest son and 14 year old grandson with autism live with her in a raised ranch, 2 dogs, 3 chickens  Professional: Works as  at special needs     Review of Systems   Constitutional: Negative.    HENT: Negative.     Eyes: Negative.    Respiratory: Negative.     Cardiovascular: Negative.    Gastrointestinal: Negative.    Endocrine: Negative.    Genitourinary: Negative.    Musculoskeletal: Negative.    Skin: Negative.    Neurological: Negative.    Hematological: Negative.    Psychiatric/Behavioral: Negative.          Current Outpatient Medications   Medication Sig Dispense Refill    calcium citrate (Calcitrate) 200 mg (950 mg) tablet Take 2.5 tablets (500 mg) by mouth once daily.      cholecalciferol (Vitamin D-3) 5,000 Units tablet Take 1 tablet (5,000 Units) by mouth once daily.      cyanocobalamin, vitamin B-12, (Vitamin B-12) 1,000 mcg tablet extended release Take 1 tablet (1,000 mcg) by mouth once daily.      lactobacillus combination no.4 (Probiotic) 3 billion cell capsule as directed      levothyroxine (Synthroid, Levoxyl) 125 mcg tablet Take 1 tablet (125 mcg) by mouth early in the morning..      multivitamin tablet,chewable Chew 2 tablets once daily. Flintstones vitamin with iron      valACYclovir (Valtrex) 1 gram tablet TAKE AS DIRECTED  IF NEEDED. 30 tablet 0    levothyroxine (Synthroid, Levoxyl) 150 mcg tablet Take 1 tablet  "(150 mcg) by mouth once daily in the morning. Take before meals.      mirabegron (Myrbetriq) 25 mg tablet extended release 24 hr 24 hr tablet Take 1 tablet (25 mg) by mouth once daily. 90 tablet 3    montelukast (Singulair) 10 mg tablet Take 1 tablet (10 mg) by mouth once daily. 90 tablet 3    sertraline (Zoloft) 25 mg tablet Take 1 tablet (25 mg) by mouth once daily. 90 tablet 3    sertraline (Zoloft) 50 mg tablet Take 1 tablet (50 mg) by mouth once daily. Take in addition to 25mg tablet 90 tablet 3     No current facility-administered medications for this visit.     Past Surgical History:   Procedure Laterality Date    BREAST BIOPSY Left     OTHER SURGICAL HISTORY  2021    Appendectomy    OTHER SURGICAL HISTORY  2021    Cyst excision    OTHER SURGICAL HISTORY  2021    Tonsillectomy    OTHER SURGICAL HISTORY  2021    Oral surgery    OTHER SURGICAL HISTORY  2021     section    OTHER SURGICAL HISTORY  2021    Carpal tunnel surgery    OTHER SURGICAL HISTORY  2021    Ovarian cystectomy    OTHER SURGICAL HISTORY  2021    Elbow surgery    OTHER SURGICAL HISTORY  2022    Gastric bypass surgery     No family history on file.   Social History     Tobacco Use    Smoking status: Never    Smokeless tobacco: Never   Vaping Use    Vaping status: Never Used   Substance Use Topics    Alcohol use: Never    Drug use: Never        Objective     Visit Vitals  /70 (BP Location: Left arm, Patient Position: Sitting, BP Cuff Size: Adult)   Pulse 74   Temp 36 °C (96.8 °F) (Temporal)   Resp 18   Ht 1.626 m (5' 4\")   Wt 69.9 kg (154 lb)   SpO2 99%   BMI 26.43 kg/m²   OB Status Menopausal   Smoking Status Never   BSA 1.78 m²        Physical Exam  Constitutional:       Appearance: Normal appearance.   HENT:      Head: Normocephalic and atraumatic.   Eyes:      Extraocular Movements: Extraocular movements intact.      Pupils: Pupils are equal, round, and reactive to light. "   Cardiovascular:      Rate and Rhythm: Normal rate and regular rhythm.   Pulmonary:      Effort: Pulmonary effort is normal.      Breath sounds: Normal breath sounds.   Abdominal:      General: Abdomen is flat. Bowel sounds are normal.      Palpations: Abdomen is soft.   Musculoskeletal:         General: Normal range of motion.   Skin:     General: Skin is warm and dry.      Capillary Refill: Capillary refill takes less than 2 seconds.   Neurological:      General: No focal deficit present.      Mental Status: She is alert and oriented to person, place, and time.   Psychiatric:         Mood and Affect: Mood normal.         Behavior: Behavior normal.           Assessment/Plan   Problem List Items Addressed This Visit       Allergic rhinitis    Relevant Medications    montelukast (Singulair) 10 mg tablet    Anxiety    Relevant Medications    sertraline (Zoloft) 50 mg tablet    sertraline (Zoloft) 25 mg tablet    Urge incontinence of urine    Relevant Medications    mirabegron (Myrbetriq) 25 mg tablet extended release 24 hr 24 hr tablet    Healthcare maintenance     -Healthy diet, good quality and duration of sleep, healthy water intake, regular exercise and healthy weight discussed  -Immunizations:   Flu: Recommended annually  Shingrix: Series complete   RSV: Recommended  Tdap: Last in 8/2019  -Following with dentistry and optometry regularly  -Colon cancer screening: Colonoscopy in 7/2023, repeat in 3 years  -Mammogram: Annually in April, last in 4/2024  -DEXA DEXA 7/2023, repeat in 7/2025  -GYN: Following annually with Elicia Tafoya NP  -No concerns for anxiety/depression  -Tobacco never, EtOH none, No illicit/recreational drugs  -Feeling safe at home           Other Visit Diagnoses       Need for pneumococcal vaccination    -  Primary    Relevant Orders    Pneumococcal conjugate vaccine, 20-valent (PREVNAR 20)            All pertinent lab work and results were reviewed with patient.     Follow up with me in 1 year      Vivian Cronin, APRN-CNS

## 2024-06-07 NOTE — ASSESSMENT & PLAN NOTE
-Healthy diet, good quality and duration of sleep, healthy water intake, regular exercise and healthy weight discussed  -Immunizations:   Flu: Recommended annually  Shingrix: Series complete   RSV: Recommended  Tdap: Last in 8/2019  -Following with dentistry and optometry regularly  -Colon cancer screening: Colonoscopy in 7/2023, repeat in 3 years  -Mammogram: Annually in April, last in 4/2024  -DEXA DEXA 7/2023, repeat in 7/2025  -GYN: Following annually with Elicia Tafoya NP  -No concerns for anxiety/depression  -Tobacco never, EtOH none, No illicit/recreational drugs  -Feeling safe at home

## 2024-06-07 NOTE — PATIENT INSTRUCTIONS
Thank you for coming to see me today.  If you have any questions or concerns following our visit, please contact the office.  Phone: [phone]    Follow up with me in 1 year or sooner as needed    1)  Consider getting Arexvy vaccine from local pharmacy to protect you from RSV

## 2024-07-08 DIAGNOSIS — E03.9 HYPOTHYROIDISM, UNSPECIFIED TYPE: Primary | ICD-10-CM

## 2024-07-09 RX ORDER — LEVOTHYROXINE SODIUM 125 UG/1
125 TABLET ORAL DAILY
Qty: 90 TABLET | Refills: 0 | Status: SHIPPED | OUTPATIENT
Start: 2024-07-09

## 2024-08-22 DIAGNOSIS — N64.52 NIPPLE DISCHARGE: Primary | ICD-10-CM

## 2024-09-05 ENCOUNTER — HOSPITAL ENCOUNTER (OUTPATIENT)
Dept: RADIOLOGY | Facility: HOSPITAL | Age: 64
Discharge: HOME | End: 2024-09-05
Payer: COMMERCIAL

## 2024-09-05 DIAGNOSIS — N64.52 NIPPLE DISCHARGE: ICD-10-CM

## 2024-09-05 PROCEDURE — 77061 BREAST TOMOSYNTHESIS UNI: CPT | Mod: LT

## 2024-09-10 ENCOUNTER — LAB (OUTPATIENT)
Dept: LAB | Facility: LAB | Age: 64
End: 2024-09-10
Payer: COMMERCIAL

## 2024-09-10 DIAGNOSIS — E03.9 HYPOTHYROIDISM, UNSPECIFIED TYPE: ICD-10-CM

## 2024-09-10 LAB — TSH SERPL-ACNC: 1.99 MIU/L (ref 0.44–3.98)

## 2024-09-10 PROCEDURE — 84443 ASSAY THYROID STIM HORMONE: CPT

## 2024-09-10 PROCEDURE — 36415 COLL VENOUS BLD VENIPUNCTURE: CPT

## 2024-09-11 ENCOUNTER — APPOINTMENT (OUTPATIENT)
Dept: SURGERY | Facility: CLINIC | Age: 64
End: 2024-09-11
Payer: COMMERCIAL

## 2024-09-11 VITALS
SYSTOLIC BLOOD PRESSURE: 111 MMHG | HEART RATE: 51 BPM | OXYGEN SATURATION: 94 % | WEIGHT: 156 LBS | HEIGHT: 64 IN | DIASTOLIC BLOOD PRESSURE: 64 MMHG | BODY MASS INDEX: 26.63 KG/M2

## 2024-09-11 DIAGNOSIS — N64.52 NIPPLE DISCHARGE: Primary | ICD-10-CM

## 2024-09-11 DIAGNOSIS — D24.2 INTRADUCTAL PAPILLOMA OF LEFT BREAST: ICD-10-CM

## 2024-09-11 PROCEDURE — 3008F BODY MASS INDEX DOCD: CPT | Performed by: SURGERY

## 2024-09-11 PROCEDURE — 1160F RVW MEDS BY RX/DR IN RCRD: CPT | Performed by: SURGERY

## 2024-09-11 PROCEDURE — 3078F DIAST BP <80 MM HG: CPT | Performed by: SURGERY

## 2024-09-11 PROCEDURE — 1036F TOBACCO NON-USER: CPT | Performed by: SURGERY

## 2024-09-11 PROCEDURE — 3074F SYST BP LT 130 MM HG: CPT | Performed by: SURGERY

## 2024-09-11 PROCEDURE — 99214 OFFICE O/P EST MOD 30 MIN: CPT | Performed by: SURGERY

## 2024-09-11 PROCEDURE — 1159F MED LIST DOCD IN RCRD: CPT | Performed by: SURGERY

## 2024-09-11 NOTE — PROGRESS NOTES
GENERAL SURGERY OFFICE NOTE    Patient: Marielena Vides    Age: 64 y.o.   Gender: female    MRN: [id number]    PCP: FRITZ Sierra        SUBJECTIVE     Chief Complaint  Follow-up (Patient is here for left breast follow up. Patient states that she still has discharge from the nipple but it has not been bloody. )       HPI  Marielena returns to the office with complaints of worsening left nipple drainage.  She was previously seen in 2021 for left nipple drainage and a left subareolar mass.  In April 2021, she had undergone a biopsy of the left breast mass which demonstrated an intraductal papilloma.  This was monitored every 6 months for 2 years and had no change in the mass.  She was having very minimal nipple drainage at that time, so she elected not to have the intraductal papilloma removed.  She went back to her yearly screening mammograms.  She called recently noting that over the last 6 to 8 weeks, she has had increased amount of left nipple drainage.  For 1 week, the drainage had turned very bloody, but now is back to the clear drainage.  The drainage has decreased, but has not gone back to the baseline.  She is also noticed very intense nipple pruritus and some mild nipple swelling.  She has no new palpable masses.  There was some mild redness to the breast when she was having the bloody nipple drainage, but this has since resolved.  She was sent for a diagnostic mammogram of the left breast (as her last bilateral screening mammogram was just a few months ago and was not demonstrating anything concerning), which showed that the area of the intraductal papilloma was unchanged.  No new findings.  She is anticipating retiring at the end of December.     Risk factors for breast cancer: 64-year-old white female; menarche at age 9; first live birth at age 18; 1 breast biopsy (left) demonstrating intraductal papilloma; no family history of breast cancer. Father did have colon cancer in his mid 80s. She  went through menopause at age 49; she has never used hormone replacement therapy. She did use birth control pills for about 20 to 25 years. This gives her a 5-year Zhane score of 1.4% and a lifetime risk of 6.5% which overall puts her in an slightly less than average risk category. Using the [facility] Breast cancer risk evaluation tool, this patient's 10-year risk of breast cancer is 2.7% ( average risk is 3.4%) and lifetime risk is 5.6% (average lifetime risk is 7.0%). This puts her in a slightly less than average risk category for developing breast cancer.    ROS  Review of Systems   Constitutional: no fever,~no chills,~no recent weight gain~and~no recent weight loss.   Eyes: no loss of vision,~no discharge from the eyes,~no itching of the eyes~and~no eye pain.   ENT: no hearing loss,~no neck pain~and~no hoarseness.   Cardiovascular: lower extremity edema, but~no chest pain~and~no palpitations.   Respiratory: dyspnea during exertion, but~no dyspnea~and~no cough.   Breast: nipple discharge, but~no breast mass,~no pain in breast,~no erythema,~no change in breast skin~and~no axillary adenopathy.   Gastrointestinal: no abdominal pain,~no vomiting,~bowel movement frequency normal,~no nausea.   Genitourinary: no dysuria~and~no hematuria.   Musculoskeletal: arthralgias, but~no myalgias.   Integumentary: no rashes~and~no skin lesions.   Neurological: no headache,~no dizziness,~no numbness,~no tingling~and~no limb weakness.   Psychiatric: no anxiety,~no depression~and~no emotional problems.   Endocrine: no heat or cold intolerance~and~no increased thirst.   Hematologic/Lymphatic: no tendency for easy bleeding~and~no tendency for easy bruising.   All other systems have been reviewed and are negative for complaint.     HISTORY     Past Medical History:   Diagnosis Date    Abnormal ECG 11.21    Hypertension     Morbid obesity due to excess calories (Multi) 04/20/2021    Obesity 06/07/2024    Obesity with body mass index 30  or greater 2024    S/P bariatric surgery 2023    Italo en Y bypass in May 2022  Has since lost 80 lbs. Doing well since surgery        Past Surgical History:   Procedure Laterality Date    BREAST BIOPSY Left      SECTION, LOW TRANSVERSE      GASTRIC BYPASS      OTHER SURGICAL HISTORY  2021    Appendectomy    OTHER SURGICAL HISTORY  2021    Cyst excision    OTHER SURGICAL HISTORY  2021    Tonsillectomy    OTHER SURGICAL HISTORY  2021    Oral surgery    OTHER SURGICAL HISTORY  2021     section    OTHER SURGICAL HISTORY  2021    Carpal tunnel surgery    OTHER SURGICAL HISTORY  2021    Ovarian cystectomy    OTHER SURGICAL HISTORY  2021    Elbow surgery    OTHER SURGICAL HISTORY  2022    Gastric bypass surgery        Family History   Problem Relation Name Age of Onset    Arthritis Mother Ghada Dialloton     COPD Mother Ghada Dailloton     Heart disease Mother Ghada Tyler     Colon cancer Father Carl Jayson     Heart disease Father Carl Jayson     Hyperlipidemia Father Carl Dialloton     Hypertension Father Carl Jayson     Asthma Brother Naman Hernandezrington     Depression Daughter Rosy Vides     Drug abuse Daughter Rosy Vides         Allergies   Allergen Reactions    Oxaprozin Unknown    Tramadol Unknown    Triamcinolone Acetonide Other    Beta-Blockers (Beta-Adrenergic Blocking Agts) Rash     Fatigue- unable to tolerate even at low dose        Social History     Tobacco Use   Smoking Status Never   Smokeless Tobacco Never        Social History     Substance and Sexual Activity   Alcohol Use Never        HOME MEDICATIONS  Current Outpatient Medications   Medication Instructions    calcium citrate (CALCITRATE) 500 mg, oral, Daily RT    cholecalciferol (Vitamin D-3) 5,000 Units tablet 1 tablet, oral, Daily    cyanocobalamin, vitamin B-12, (Vitamin B-12) 1,000 mcg tablet extended release 1 tablet,  "oral, Daily    lactobacillus combination no.4 (Probiotic) 3 billion cell capsule as directed    levothyroxine (SYNTHROID, LEVOXYL) 150 mcg, oral, Daily before breakfast    levothyroxine (SYNTHROID, LEVOXYL) 125 mcg, oral, Daily    mirabegron (MYRBETRIQ) 25 mg, oral, Daily    montelukast (SINGULAIR) 10 mg, oral, Daily    multivitamin tablet,chewable 2 tablets, oral, Daily RT, Flintstones vitamin with iron    sertraline (ZOLOFT) 50 mg, oral, Daily, Take in addition to 25mg tablet    sertraline (ZOLOFT) 25 mg, oral, Daily    valACYclovir (Valtrex) 1 gram tablet TAKE AS DIRECTED  IF NEEDED.          OBJECTIVE   Last Recorded Vitals.  Blood pressure 111/64, pulse 51, height 1.626 m (5' 4\"), weight 70.8 kg (156 lb), SpO2 94%.     PHYSICAL EXAM  Physical Exam   General: Well-developed, well-nourished and in no acute distress.  Head: Normocephalic. Atraumatic.  Neck/thyroid: Neck is supple. Normal thyroid without mass. No jugular venous distention.  Eyes: Pupils equal round and reactive to light. Conjunctiva normal.  ENMT: No masses or deformity of external nose. External ears without masses.  Respiratory/Chest: Normal respiratory effort.  Breast: Moderate breasts. Fibrocystic changes of both breasts especially of the lower outer quadrants and upper outer quadrants of each breast. Symmetrical. No palpable abnormality of the right breast. No palpable abnormality of the left breast. Small red skin lesion on the upper outer quadrant of the areola without any associated cellulitis. Expressible clear nipple drainage from the left nipple. No skin changes.  Lymphatics: No palpable lymphadenopathy of the cervical, supraclavicular or axillary regions.  Cardiovascular: Regular rate and rhythm.   Abdomen: Soft, nontender, nondistended.  Musculoskeletal: Joints and limbs are grossly normal. Normal gait. Normal range of motion of major joints.  Neuro: Oriented to person, place and time. No obvious neurological deficit. Motor strength " "grossly normal.  Psych: Normal mood and affect.     RESULTS   Labs  SURGICAL PATHOLOGY  Order: [id number]   Status: Final result       Visible to patient: Yes (not seen)    0 Result Notes   important suggestion  Newer results are available. Click to view them now.         Component 3 yr ago   Pathology Report Name CM HODGE                                                                                                 Accession #: [id number]            Pathologist:                   MANOLO TRIVEDI MD  Date of Procedure:    4/7/2021  Date Received:          4/7/2021  Date Reported           4/12/2021  Submitting Physician:   PRECIOUS DOUGLAS MD  Location:                      Copy To/Referring/Attending:  PRECIOUS DOUGLAS MD Other External #  MARIA ELENA NGUYEN MD                                                                   FINAL DIAGNOSIS  A.  LEFT BREAST MASS, ULTRASOUND GUIDED CORE NEEDLE BIOPSY:    -- INTRADUCTAL PAPILLOMA.                                                                                                                                                                                                                                                                                                                                                                                                                                                                                     Electronically Signed Out By MANOLO TRIVEDI MD/FIORELLA  By the signature on this report, the individual or group listed as making the  Final Interpretation/Diagnosis certifies that they have reviewed this case.           Clinical History:  LEFT NIPPLE BLOODY DISCHARGE  BREAST MASS, LEFT N63.20    Specimens Submitted As:  A: LEFT BREAST MASS    Gross Description:  Received in formalin, labeled with the patient's name and hospital number and  \"LT breast\", is an irregular/cylindrical segments of " yellow-white fatty soft  tissue measuring 1.5 x 0.2 x 0.1 cm.  The specimen is submitted in toto in one  cassette.  DPG    NOTE:  Ischemia time: 11.00.  This specimen was placed into formalin at: Not provided.    dpg/4/8/2021              [facility]  Department of Pathology  [address] [place] [address]     CONVERTED FINAL DIAGNOSIS A.  LEFT BREAST MASS, ULTRASOUND GUIDED CORE NEEDLE BIOPSY:    -- INTRADUCTAL PAPILLOMA.          [facility]  mammo bilateral screening tomosynthesis  Status: Final result     PACS Images     Show images for BI mammo bilateral screening tomosynthesis  Signed by    Signed Time Phone Pager   Augie Anderson MD 4/26/2024 09:54 [phone]     Exam Information    Status Exam Begun Exam Ended   Final 4/26/2024 08:22 4/26/2024 08:32     Study Result    Narrative & Impression   Interpreted By:  Augie Anderson,   STUDY:  BI MAMMO BILATERAL SCREENING TOMOSYNTHESIS;  4/26/2024 8:32 am      ACCESSION NUMBER(S):  [id number]      ORDERING CLINICIAN:  PRECIOUS DOUGLAS      INDICATION:  Screening.      COMPARISON:  Multiple prior examinations dating back to 04/07/2021      FINDINGS:  2D and tomosynthesis images were reviewed at 1 mm slice thickness.      Density:  There are areas of scattered fibroglandular tissue.      No suspicious masses or calcifications are identified.      IMPRESSION:  No mammographic evidence of malignancy.      Based on the Tyrer-Cuzick model for breast cancer risk assessment,  the patient's lifetime risk of breast cancer is 4.61%. Patients with  over a 20% lifetime risk of developing breast cancer may benefit from  additional screening with breast MRI or ultrasound. Please note that  this estimate is based on responses provided on the patient  questionnaire. For more information regarding high risk consultation,  please call [phone].      BI-RADS CATEGORY:      BI-RADS Category:  1  Negative.  Recommendation:  Annual Screening.  Recommended Date:  1 Year.  Laterality:  Bilateral.      For any future breast imaging appointments, please call [phone]  ([phone]).          MACRO:  None          Signed by: Augie Anderson 4/26/2024 9:54 AM  Dictation workstation:   [id number]     mammo left diagnostic tomosynthesis  Status: Final result     PACS Images     Show images for BI mammo left diagnostic tomosynthesis  Signed by    Signed Time Phone Pager   Jessica Moya MD 9/05/2024 15:53 [phone]     Exam Information    Status Exam Begun Exam Ended   Final 9/05/2024 15:13 9/05/2024 15:20     Study Result    Narrative & Impression   Interpreted By:  Jessica Moya,   STUDY:  BI MAMMO LEFT DIAGNOSTIC TOMOSYNTHESIS;  9/5/2024 3:20 pm      ACCESSION NUMBER(S):  [id number]      ORDERING CLINICIAN:  PRECIOUS DOUGLAS      INDICATION:  Signs/Symptoms:Recurrent left nipple discharge with history of  intraductal papilloma.      ,N64.52 Nipple discharge          COMPARISON:  Mammography 04/26/2024, 04/20/2023 and 04/19/2022      FINDINGS:  2D and tomosynthesis images were reviewed at 1 mm slice thickness.      Density:  There are areas of scattered fibroglandular tissue.      Biopsy marker in the slightly medial subareolar left breast is  unchanged in position. No surrounding mass is noted and there is no  change in mammographic density or parenchymal contour in the area. No  suspicious masses or calcifications are identified.      IMPRESSION:  No mammographic evidence of malignancy.  No change in appearance in  the area of the previously biopsied papilloma.      BI-RADS CATEGORY:      BI-RADS Category:  2 Benign.  Recommendation:  Annual Screening.  Recommended Date:  6 Months.  Laterality:  Bilateral.      For any future breast imaging appointments, please call [phone]  ([phone]).          MACRO:  None      Signed by: Jessica Moya 9/5/2024 3:53 PM  Dictation workstation:   [id number]          ASSESSMENT / PLAN   Diagnoses and all orders for this visit:  Nipple discharge  -     MR breast bilateral w contrast full protocol; Future  Intraductal papilloma of left breast      Plan  1.  She had undergone a breast MRI for workup of the nipple drainage.  The MRI demonstrated a 3 mm intraductal mass just behind the areola. A follow-up ultrasound was able to identify this 3 mm mass. Patient subsequently underwent an ultrasound-guided biopsy of the mass with pathology diagnosis of intraductal papilloma.  She elected not to have this excised, and this was monitored every 6 months for 2 years which did not demonstrate any change in the mass.  2. The patient was offered a full excision of the intraductal papilloma which she declined previously.  However, with a recent increase in the amount of nipple drainage, and having a weeks worth of significant bloody nipple drainage, she is now considering excision.  Reviewed the benefits and risk of a left Magseed lumpectomy surgery.  Given the location of the mass, she is at risk of developing a flattened or inverted nipple postoperatively.  Other risk factors included, but not limited to, infection, bleeding, non-resolution of all symptoms, recurrence of the mass, allergic reaction to medication and even death.  She is unsure if she would want to have the surgery before or after she retires in December.  Timing of the surgery will be discussed when the results of the MRI are reviewed.  3.  Since she has had a significant change in the amount and quality of the nipple drainage from the left breast recently, do feel a repeat breast MRI is warranted to rule out other causes for the nipple drainage.  Her previous intraductal papilloma was only identified on breast MRI, and not mammogram or ultrasound.  Will call patient with the results.      Joyce Freeman MD, FACS  [facility] General Surgery  [address] [place] [address];   [address]  [place]   [address] [phone]

## 2024-09-11 NOTE — PATIENT INSTRUCTIONS
1. Continue doing your self breast exams on a monthly basis. If you feel any new abnormalities, please call Dr. Freeman's office immediately. [phone]  2.  For further evaluation of the bloody nipple drainage, a breast MRI will be scheduled.  Dr. Freeman will call you with these results.  3.  Would recommend excision of the left breast mass (intraductal papilloma) to help reduce the recurrent nipple drainage.  This can be performed on an outpatient basis.  Timing of the surgery will be discussed with you when Dr. Freeman calls with the MRI results.

## 2024-09-12 ENCOUNTER — TELEPHONE (OUTPATIENT)
Dept: SURGERY | Facility: CLINIC | Age: 64
End: 2024-09-12
Payer: COMMERCIAL

## 2024-09-12 NOTE — TELEPHONE ENCOUNTER
ANAND Lloyd with [facility] called and states that the patient is going to have her breast MRI done at [facility]. I have faxed the order.

## 2024-09-20 ENCOUNTER — APPOINTMENT (OUTPATIENT)
Dept: ENDOCRINOLOGY | Facility: CLINIC | Age: 64
End: 2024-09-20
Payer: COMMERCIAL

## 2024-09-20 VITALS
SYSTOLIC BLOOD PRESSURE: 116 MMHG | BODY MASS INDEX: 26.36 KG/M2 | WEIGHT: 154.4 LBS | DIASTOLIC BLOOD PRESSURE: 73 MMHG | HEIGHT: 64 IN | TEMPERATURE: 97.5 F | HEART RATE: 59 BPM

## 2024-09-20 DIAGNOSIS — E03.9 HYPOTHYROIDISM, UNSPECIFIED TYPE: Primary | ICD-10-CM

## 2024-09-20 PROCEDURE — 1036F TOBACCO NON-USER: CPT | Performed by: NURSE PRACTITIONER

## 2024-09-20 PROCEDURE — 1159F MED LIST DOCD IN RCRD: CPT | Performed by: NURSE PRACTITIONER

## 2024-09-20 PROCEDURE — 99213 OFFICE O/P EST LOW 20 MIN: CPT | Performed by: NURSE PRACTITIONER

## 2024-09-20 PROCEDURE — 3078F DIAST BP <80 MM HG: CPT | Performed by: NURSE PRACTITIONER

## 2024-09-20 PROCEDURE — 3008F BODY MASS INDEX DOCD: CPT | Performed by: NURSE PRACTITIONER

## 2024-09-20 PROCEDURE — 1160F RVW MEDS BY RX/DR IN RCRD: CPT | Performed by: NURSE PRACTITIONER

## 2024-09-20 PROCEDURE — 3074F SYST BP LT 130 MM HG: CPT | Performed by: NURSE PRACTITIONER

## 2024-09-20 RX ORDER — LEVOTHYROXINE SODIUM 125 UG/1
125 TABLET ORAL DAILY
Qty: 90 TABLET | Refills: 3 | Status: SHIPPED | OUTPATIENT
Start: 2024-09-20

## 2024-09-20 ASSESSMENT — PATIENT HEALTH QUESTIONNAIRE - PHQ9
2. FEELING DOWN, DEPRESSED OR HOPELESS: NOT AT ALL
1. LITTLE INTEREST OR PLEASURE IN DOING THINGS: NOT AT ALL
SUM OF ALL RESPONSES TO PHQ9 QUESTIONS 1 AND 2: 0

## 2024-09-20 NOTE — PATIENT INSTRUCTIONS
Continue levothyroxine 125 mcg once daily     Get labs prior to next visit     Follow up 1 year, sooner if needed

## 2024-09-20 NOTE — PROGRESS NOTES
"Subjective   Marielena Vides is a 64 y.o. female presents today for a follow up visit  regarding hypothyroidism.   The patient was initially diagnosed around over 30 year ago. At time of diagnosis, she was postpartum and very depressed. Found to have thyroid condition at that time. Family history of hypothyroidism in mother and brother.   History of bariatric surgery in 2022     Last visit with me in 9/2023  (+) Tg in 12/2022. TPO (-) in 2020  Since last visit, overall feeling well  Does not report any changes    The patient is currently taking levothyroxine 125 mcg once daily   Takes appropriately in the middle of night            ROS  Thyroid pain: no  Mass effect: denies difficulty swallowing, change in voice, difficulty breathing, pressure at neck   Energy: decreased overall    Sleep: mixed sleep.  Sleep ok some nights and difficulty falling asleep   Temperature Intolerance: Denies heat or cold intolerance   GYN: postmenopausal   Cardiovascular:  denies heart palpitations or chest pain  GI: constipation prone but takes miralax to help keep her regular   Weight: stable  Memory: good  Diaphoresis: (+) hot flashes  Skin: no changes to skin, hair, nails,  Neuro: negative headaches, seizures, tremors      Objective    Physical Exam  Blood pressure 116/73, pulse 59, temperature 36.4 °C (97.5 °F), temperature source Temporal, height 1.626 m (5' 4\"), weight 70 kg (154 lb 6.4 oz).  General: not in acute distress, cooperative  Thyroid: thyroid is normal in size without nodules or tenderness  Respiratory: normal respiratory effort  Musculoskeletal: normal gait        Current Outpatient Medications:     calcium citrate (Calcitrate) 200 mg (950 mg) tablet, Take 2.5 tablets (500 mg) by mouth once daily., Disp: , Rfl:     cholecalciferol (Vitamin D-3) 5,000 Units tablet, Take 1 tablet (5,000 Units) by mouth once daily., Disp: , Rfl:     cyanocobalamin, vitamin B-12, (Vitamin B-12) 1,000 mcg tablet extended release, Take 1 " tablet (1,000 mcg) by mouth once daily., Disp: , Rfl:     lactobacillus combination no.4 (Probiotic) 3 billion cell capsule, as directed, Disp: , Rfl:     levothyroxine (Synthroid, Levoxyl) 125 mcg tablet, TAKE 1 TABLET BY MOUTH EVERY DAY., Disp: 90 tablet, Rfl: 0    mirabegron (Myrbetriq) 25 mg tablet extended release 24 hr 24 hr tablet, Take 1 tablet (25 mg) by mouth once daily., Disp: 90 tablet, Rfl: 3    montelukast (Singulair) 10 mg tablet, Take 1 tablet (10 mg) by mouth once daily., Disp: 90 tablet, Rfl: 3    multivitamin tablet,chewable, Chew 2 tablets once daily. Flintstones vitamin with iron, Disp: , Rfl:     sertraline (Zoloft) 25 mg tablet, Take 1 tablet (25 mg) by mouth once daily., Disp: 90 tablet, Rfl: 3    sertraline (Zoloft) 50 mg tablet, Take 1 tablet (50 mg) by mouth once daily. Take in addition to 25mg tablet, Disp: 90 tablet, Rfl: 3    valACYclovir (Valtrex) 1 gram tablet, TAKE AS DIRECTED  IF NEEDED., Disp: 30 tablet, Rfl: 0          Assessment/Plan   Problem List Items Addressed This Visit       Hypothyroidism - Primary    Relevant Medications    levothyroxine (Synthroid, Levoxyl) 125 mcg tablet    Other Relevant Orders    TSH with reflex to Free T4 if abnormal   Comment: Clinically and biochemically euthyroid. Has some fatigue but relates to work stress.  Plans to retire in December.  No changes to her medication.        Plan:   Continue levothyroxine 125 mcg once daily   Get labs prior to next visit   Follow up 1 year, sooner if needed

## 2024-09-25 ENCOUNTER — APPOINTMENT (OUTPATIENT)
Dept: RADIOLOGY | Facility: CLINIC | Age: 64
End: 2024-09-25
Payer: COMMERCIAL

## 2024-09-26 ENCOUNTER — HOSPITAL ENCOUNTER (OUTPATIENT)
Dept: RADIOLOGY | Facility: CLINIC | Age: 64
Discharge: HOME | End: 2024-09-26
Payer: COMMERCIAL

## 2024-09-26 DIAGNOSIS — N64.52 NIPPLE DISCHARGE: ICD-10-CM

## 2024-09-26 PROCEDURE — 77049 MRI BREAST C-+ W/CAD BI: CPT | Performed by: RADIOLOGY

## 2024-09-26 PROCEDURE — 2550000001 HC RX 255 CONTRASTS: Performed by: SURGERY

## 2024-09-26 PROCEDURE — 77049 MRI BREAST C-+ W/CAD BI: CPT

## 2024-09-26 PROCEDURE — A9575 INJ GADOTERATE MEGLUMI 0.1ML: HCPCS | Performed by: SURGERY

## 2024-09-26 RX ORDER — GADOTERATE MEGLUMINE 376.9 MG/ML
14 INJECTION INTRAVENOUS
Status: COMPLETED | OUTPATIENT
Start: 2024-09-26 | End: 2024-09-26

## 2024-09-27 ENCOUNTER — APPOINTMENT (OUTPATIENT)
Dept: SURGERY | Facility: CLINIC | Age: 64
End: 2024-09-27
Payer: COMMERCIAL

## 2024-10-01 ENCOUNTER — APPOINTMENT (OUTPATIENT)
Facility: CLINIC | Age: 64
End: 2024-10-01
Payer: COMMERCIAL

## 2024-10-01 DIAGNOSIS — D24.2 INTRADUCTAL PAPILLOMA OF LEFT BREAST: Primary | ICD-10-CM

## 2024-10-01 DIAGNOSIS — N63.12 MASS OF UPPER INNER QUADRANT OF RIGHT BREAST: ICD-10-CM

## 2024-10-01 DIAGNOSIS — N64.52 NIPPLE DISCHARGE: ICD-10-CM

## 2024-10-01 PROCEDURE — 1036F TOBACCO NON-USER: CPT | Performed by: SURGERY

## 2024-10-01 PROCEDURE — 1160F RVW MEDS BY RX/DR IN RCRD: CPT | Performed by: SURGERY

## 2024-10-01 PROCEDURE — 1159F MED LIST DOCD IN RCRD: CPT | Performed by: SURGERY

## 2024-10-01 PROCEDURE — 99441 PR PHYS/QHP TELEPHONE EVALUATION 5-10 MIN: CPT | Performed by: SURGERY

## 2024-10-01 NOTE — PATIENT INSTRUCTIONS
"1. Continue doing your self breast exams on a monthly basis. If you feel any new abnormalities, please call Dr. Freeman's office immediately. [phone]  2.  The MRI of your breast does not demonstrate a recurrence of your intraductal papilloma of your left breast.  Since you are not having any further nipple drainage, this area will be reevaluated in 6 months when you have your repeat MRI in 6 months for the \"new right breast mass\".  If you do develop recurrent nipple drainage, please call Dr. Freeman's office.  [phone]  3.  The MRI of your breast suggest a abnormality in your right breast for which 6-month follow-up MRI is recommended.  Follow-up in Dr. Freeman's office after this MRI.  "

## 2024-10-30 ENCOUNTER — APPOINTMENT (OUTPATIENT)
Dept: SURGERY | Facility: CLINIC | Age: 64
End: 2024-10-30
Payer: COMMERCIAL

## 2024-11-05 ENCOUNTER — APPOINTMENT (OUTPATIENT)
Dept: DERMATOLOGY | Facility: CLINIC | Age: 64
End: 2024-11-05
Payer: COMMERCIAL

## 2024-11-05 ENCOUNTER — OFFICE VISIT (OUTPATIENT)
Facility: CLINIC | Age: 64
End: 2024-11-05
Payer: COMMERCIAL

## 2024-11-05 ENCOUNTER — TELEPHONE (OUTPATIENT)
Dept: RADIOLOGY | Facility: HOSPITAL | Age: 64
End: 2024-11-05

## 2024-11-05 VITALS
HEIGHT: 64 IN | BODY MASS INDEX: 26.67 KG/M2 | HEART RATE: 57 BPM | WEIGHT: 156.2 LBS | OXYGEN SATURATION: 97 % | DIASTOLIC BLOOD PRESSURE: 62 MMHG | SYSTOLIC BLOOD PRESSURE: 98 MMHG

## 2024-11-05 DIAGNOSIS — N64.52 NIPPLE DISCHARGE: ICD-10-CM

## 2024-11-05 DIAGNOSIS — D24.2 INTRADUCTAL PAPILLOMA OF LEFT BREAST: Primary | ICD-10-CM

## 2024-11-05 DIAGNOSIS — N63.12 MASS OF UPPER INNER QUADRANT OF RIGHT BREAST: ICD-10-CM

## 2024-11-05 PROCEDURE — 3078F DIAST BP <80 MM HG: CPT | Performed by: SURGERY

## 2024-11-05 PROCEDURE — 1160F RVW MEDS BY RX/DR IN RCRD: CPT | Performed by: SURGERY

## 2024-11-05 PROCEDURE — 1036F TOBACCO NON-USER: CPT | Performed by: SURGERY

## 2024-11-05 PROCEDURE — 3074F SYST BP LT 130 MM HG: CPT | Performed by: SURGERY

## 2024-11-05 PROCEDURE — 99213 OFFICE O/P EST LOW 20 MIN: CPT | Performed by: SURGERY

## 2024-11-05 PROCEDURE — 3008F BODY MASS INDEX DOCD: CPT | Performed by: SURGERY

## 2024-11-05 PROCEDURE — 1159F MED LIST DOCD IN RCRD: CPT | Performed by: SURGERY

## 2024-11-05 ASSESSMENT — DERMATOLOGY QUALITY OF LIFE (QOL) ASSESSMENT
RATE HOW BOTHERED YOU ARE BY EFFECTS OF YOUR SKIN PROBLEMS ON YOUR ACTIVITIES (EG, GOING OUT, ACCOMPLISHING WHAT YOU WANT, WORK ACTIVITIES OR YOUR RELATIONSHIPS WITH OTHERS): 0 - NEVER BOTHERED
WHAT SINGLE SKIN CONDITION LISTED BELOW IS THE PATIENT ANSWERING THE QUALITY-OF-LIFE ASSESSMENT QUESTIONS ABOUT: NONE OF THE ABOVE
RATE HOW BOTHERED YOU ARE BY SYMPTOMS OF YOUR SKIN PROBLEM (EG, ITCHING, STINGING BURNING, HURTING OR SKIN IRRITATION): 0 - NEVER BOTHERED
RATE HOW BOTHERED YOU ARE BY EFFECTS OF YOUR SKIN PROBLEMS ON YOUR ACTIVITIES (EG, GOING OUT, ACCOMPLISHING WHAT YOU WANT, WORK ACTIVITIES OR YOUR RELATIONSHIPS WITH OTHERS): 0 - NEVER BOTHERED
DATE THE QUALITY-OF-LIFE ASSESSMENT WAS COMPLETED: 67149
WHAT SINGLE SKIN CONDITION LISTED BELOW IS THE PATIENT ANSWERING THE QUALITY-OF-LIFE ASSESSMENT QUESTIONS ABOUT: NONE OF THE ABOVE
RATE HOW EMOTIONALLY BOTHERED YOU ARE BY YOUR SKIN PROBLEM (FOR EXAMPLE, WORRY, EMBARRASSMENT, FRUSTRATION): 0 - NEVER BOTHERED
RATE HOW BOTHERED YOU ARE BY SYMPTOMS OF YOUR SKIN PROBLEM (EG, ITCHING, STINGING BURNING, HURTING OR SKIN IRRITATION): 0 - NEVER BOTHERED
RATE HOW EMOTIONALLY BOTHERED YOU ARE BY YOUR SKIN PROBLEM (FOR EXAMPLE, WORRY, EMBARRASSMENT, FRUSTRATION): 0 - NEVER BOTHERED

## 2024-11-05 ASSESSMENT — PATIENT GLOBAL ASSESSMENT (PGA): WHAT IS THE PGA: PATIENT GLOBAL ASSESSMENT:  1 - CLEAR

## 2024-11-05 NOTE — PROGRESS NOTES
GENERAL SURGERY OFFICE NOTE    Patient: Marielena Vides    Age: 64 y.o.   Gender: female    MRN: [id number]    PCP: [facility]        SUBJECTIVE     Chief Complaint  Follow-up (Patient is here for a left breast follow up. Patient states that the itching and discharge of the left breast has gotten worse. )       HPI  Marielena returns to the office with complaints of recurrent pruritus and clear drainage from the left breast.  She has had a chronic issue with left breast clear nipple drainage.  In the past, she was found to have a small intraductal papilloma in the left retroareolar region.  Her drainage seemed to decrease after the biopsy was performed, but now it is recurring more frequent.  She states she gets significant itching associated with a slight reddish-brown discoloration at the 7 o'clock position of her left breast.  She usually has to manipulate the left breast to get about a quarter size amount of clear drainage out of the nipple at the 7 o'clock position.  The drainage is about the size of a quarter.  After she expresses the fluid, this makes the pruritus much better.  The whole cycle happens every 2 to 3 weeks.  She has decided to proceed with surgical intervention for the left breast biopsy-proven intraductal papilloma.     Risk factors for breast cancer: 64-year-old white female; menarche at age 9; first live birth at age 18; 1 breast biopsy (left) demonstrating intraductal papilloma; no family history of breast cancer. Father did have colon cancer in his mid 80s. She went through menopause at age 49; she has never used hormone replacement therapy. She did use birth control pills for about 20 to 25 years. This gives her a 5-year Zhane score of 1.4% and a lifetime risk of 6.5% which overall puts her in an slightly less than average risk category. Using the Tyrer-Cuzick Breast cancer risk evaluation tool, this patient's 10-year risk of breast cancer is 2.7% ( average risk is 3.4%) and  lifetime risk is 5.6% (average lifetime risk is 7.0%). This puts her in a slightly less than average risk category for developing breast cancer.    ROS  Review of Systems   Constitutional: no fever,~no chills,~no recent weight gain~and~no recent weight loss.   Eyes: no loss of vision,~no discharge from the eyes,~no itching of the eyes~and~no eye pain.   ENT: no hearing loss,~no neck pain~and~no hoarseness.   Cardiovascular: lower extremity edema, but~no chest pain~and~no palpitations.   Respiratory: dyspnea during exertion, but~no dyspnea~and~no cough.   Breast: nipple discharge, but~no breast mass,~no pain in breast,~no erythema,~no change in breast skin~and~no axillary adenopathy.   Gastrointestinal: no abdominal pain,~no vomiting,~bowel movement frequency normal,~no nausea.   Genitourinary: no dysuria~and~no hematuria.   Musculoskeletal: arthralgias, but~no myalgias.   Integumentary: no rashes~and~no skin lesions.   Neurological: no headache,~no dizziness,~no numbness,~no tingling~and~no limb weakness.   Psychiatric: no anxiety,~no depression~and~no emotional problems.   Endocrine: no heat or cold intolerance~and~no increased thirst.   Hematologic/Lymphatic: no tendency for easy bleeding~and~no tendency for easy bruising.   All other systems have been reviewed and are negative for complaint.     HISTORY     Past Medical History:   Diagnosis Date    Abnormal ECG 11.21    Hypertension     Morbid obesity due to excess calories (Multi) 2021    Obesity 2024    Obesity with body mass index 30 or greater 2024    S/P bariatric surgery 2023    Italo en Y bypass in May 2022  Has since lost 80 lbs. Doing well since surgery        Past Surgical History:   Procedure Laterality Date    BREAST BIOPSY Left      SECTION, LOW TRANSVERSE      GASTRIC BYPASS      OTHER SURGICAL HISTORY  2021    Appendectomy    OTHER SURGICAL HISTORY  2021    Cyst excision    OTHER SURGICAL HISTORY   2021    Tonsillectomy    OTHER SURGICAL HISTORY  2021    Oral surgery    OTHER SURGICAL HISTORY  2021     section    OTHER SURGICAL HISTORY  2021    Carpal tunnel surgery    OTHER SURGICAL HISTORY  2021    Ovarian cystectomy    OTHER SURGICAL HISTORY  2021    Elbow surgery    OTHER SURGICAL HISTORY  2022    Gastric bypass surgery        Family History   Problem Relation Name Age of Onset    Arthritis Mother Ghada Tyler     COPD Mother Ghada Tyler     Heart disease Mother Ghada Tyler     Colon cancer Father Carl Tyler     Heart disease Father Carl Tyler     Hyperlipidemia Father Carl Tyler     Hypertension Father Carl Tyler     Asthma Brother Naman Tyler     Depression Daughter Rosy Vides     Drug abuse Daughter Rosy Vides         Allergies   Allergen Reactions    Oxaprozin Unknown    Tramadol Unknown    Triamcinolone Acetonide Other    Beta-Blockers (Beta-Adrenergic Blocking Agts) Rash     Fatigue- unable to tolerate even at low dose        Social History     Tobacco Use   Smoking Status Never   Smokeless Tobacco Never        Social History     Substance and Sexual Activity   Alcohol Use Never        HOME MEDICATIONS  Current Outpatient Medications   Medication Instructions    calcium citrate (CALCITRATE) 500 mg, oral, Daily RT    cholecalciferol (Vitamin D-3) 5,000 Units tablet 1 tablet, oral, Daily    cyanocobalamin, vitamin B-12, (Vitamin B-12) 1,000 mcg tablet extended release 1 tablet, oral, Daily    lactobacillus combination no.4 (Probiotic) 3 billion cell capsule as directed    levothyroxine (SYNTHROID, LEVOXYL) 125 mcg, oral, Daily    mirabegron (MYRBETRIQ) 25 mg, oral, Daily    montelukast (SINGULAIR) 10 mg, oral, Daily    multivitamin tablet,chewable 2 tablets, oral, Daily RT, Flintstones vitamin with iron    sertraline (ZOLOFT) 50 mg, oral, Daily, Take in addition to 25mg tablet     "sertraline (ZOLOFT) 25 mg, oral, Daily    valACYclovir (Valtrex) 1 gram tablet TAKE AS DIRECTED  IF NEEDED.          OBJECTIVE   Last Recorded Vitals.  Blood pressure 98/62, pulse 57, height 1.62 m (5' 3.78\"), weight 70.9 kg (156 lb 3.2 oz), SpO2 97%.     PHYSICAL EXAM  Physical Exam   Phone visit/previous exam:  General: Well-developed, well-nourished and in no acute distress.  Head: Normocephalic. Atraumatic.  Neck/thyroid: Neck is supple. Normal thyroid without mass. No jugular venous distention.  Eyes: Pupils equal round and reactive to light. Conjunctiva normal.  ENMT: No masses or deformity of external nose. External ears without masses.  Respiratory/Chest: Normal respiratory effort.  Breast: Moderate breasts. Fibrocystic changes of both breasts especially of the lower outer quadrants and upper outer quadrants of each breast. Symmetrical. No palpable abnormality of the right breast. No palpable abnormality of the left breast. Small red skin lesion on the upper outer quadrant of the areola without any associated cellulitis. No expressible clear nipple drainage from the left nipple today. No skin changes.  Lymphatics: No palpable lymphadenopathy of the cervical, supraclavicular or axillary regions.  Cardiovascular: Regular rate and rhythm.   Abdomen: Soft, nontender, nondistended.  Musculoskeletal: Joints and limbs are grossly normal. Normal gait. Normal range of motion of major joints.  Neuro: Oriented to person, place and time. No obvious neurological deficit. Motor strength grossly normal.  Psych: Normal mood and affect.     RESULTS   Labs  SURGICAL PATHOLOGY  Order: [id number]   Status: Final result       Visible to patient: Yes (not seen)    0 Result Notes   important suggestion  Newer results are available. Click to view them now.         Component 3 yr ago   Pathology Report Name CM HODGE                                                                                                 Accession #: " "[id number]            Pathologist:                   MANOLO TRIVEDI MD  Date of Procedure:    4/7/2021  Date Received:          4/7/2021  Date Reported           4/12/2021  Submitting Physician:   PRECIOUS DOUGLAS MD  Location:                      Copy To/Referring/Attending:  PRECIOUS DOUGLAS MD Other External #  MARIA ELENA NGUYEN MD                                                                   FINAL DIAGNOSIS  A.  LEFT BREAST MASS, ULTRASOUND GUIDED CORE NEEDLE BIOPSY:    -- INTRADUCTAL PAPILLOMA.                                                                                                                                                                                                                                                                                                                                                                                                                                                                                     Electronically Signed Out By MANOLO TRIVEDI MD/FIORELLA  By the signature on this report, the individual or group listed as making the  Final Interpretation/Diagnosis certifies that they have reviewed this case.           Clinical History:  LEFT NIPPLE BLOODY DISCHARGE  BREAST MASS, LEFT N63.20    Specimens Submitted As:  A: LEFT BREAST MASS    Gross Description:  Received in formalin, labeled with the patient's name and hospital number and  \"LT breast\", is an irregular/cylindrical segments of yellow-white fatty soft  tissue measuring 1.5 x 0.2 x 0.1 cm.  The specimen is submitted in toto in one  cassette.  DPG    NOTE:  Ischemia time: 11.00.  This specimen was placed into formalin at: Not provided.    dpg/4/8/2021              [facility]  Department of Pathology  [id number] [place] [address]     CONVERTED FINAL DIAGNOSIS A.  LEFT BREAST MASS, ULTRASOUND GUIDED CORE NEEDLE BIOPSY:  "   -- INTRADUCTAL PAPILLOMA.          [facility]  mammo bilateral screening tomosynthesis  Status: Final result     PACS Images     Show images for BI mammo bilateral screening tomosynthesis  Signed by    Signed Time Phone Pager   Augie Anderson MD 4/26/2024 09:54 [phone]     Exam Information    Status Exam Begun Exam Ended   Final 4/26/2024 08:22 4/26/2024 08:32     Study Result    Narrative & Impression   Interpreted By:  Augie Anderson,   STUDY:  BI MAMMO BILATERAL SCREENING TOMOSYNTHESIS;  4/26/2024 8:32 am      ACCESSION NUMBER(S):  [id number]      ORDERING CLINICIAN:  PRECIOUS DOUGLAS      INDICATION:  Screening.      COMPARISON:  Multiple prior examinations dating back to 04/07/2021      FINDINGS:  2D and tomosynthesis images were reviewed at 1 mm slice thickness.      Density:  There are areas of scattered fibroglandular tissue.      No suspicious masses or calcifications are identified.      IMPRESSION:  No mammographic evidence of malignancy.      Based on the Tyrer-Cuzick model for breast cancer risk assessment,  the patient's lifetime risk of breast cancer is 4.61%. Patients with  over a 20% lifetime risk of developing breast cancer may benefit from  additional screening with breast MRI or ultrasound. Please note that  this estimate is based on responses provided on the patient  questionnaire. For more information regarding high risk consultation,  please call [phone].      BI-RADS CATEGORY:      BI-RADS Category:  1 Negative.  Recommendation:  Annual Screening.  Recommended Date:  1 Year.  Laterality:  Bilateral.      For any future breast imaging appointments, please call [phone]).          MACRO:  None          Signed by: Augie Anderson 4/26/2024 9:54 AM  Dictation workstation:   [id number]     mammo left diagnostic tomosynthesis  Status: Final result     PACS Images     Show images for BI mammo left diagnostic tomosynthesis  Signed by    Signed Time Phone  Pager   Jessica Moya MD 9/05/2024 15:53 [phone]     Exam Information    Status Exam Begun Exam Ended   Final 9/05/2024 15:13 9/05/2024 15:20     Study Result    Narrative & Impression   Interpreted By:  Jessica Moya,   STUDY:  BI MAMMO LEFT DIAGNOSTIC TOMOSYNTHESIS;  9/5/2024 3:20 pm      ACCESSION NUMBER(S):  [id number]      ORDERING CLINICIAN:  PRECIOUS DOUGLAS      INDICATION:  Signs/Symptoms:Recurrent left nipple discharge with history of  intraductal papilloma.      ,N64.52 Nipple discharge          COMPARISON:  Mammography 04/26/2024, 04/20/2023 and 04/19/2022      FINDINGS:  2D and tomosynthesis images were reviewed at 1 mm slice thickness.      Density:  There are areas of scattered fibroglandular tissue.      Biopsy marker in the slightly medial subareolar left breast is  unchanged in position. No surrounding mass is noted and there is no  change in mammographic density or parenchymal contour in the area. No  suspicious masses or calcifications are identified.      IMPRESSION:  No mammographic evidence of malignancy.  No change in appearance in  the area of the previously biopsied papilloma.      BI-RADS CATEGORY:      BI-RADS Category:  2 Benign.  Recommendation:  Annual Screening.  Recommended Date:  6 Months.  Laterality:  Bilateral.      For any future breast imaging appointments, please call [phone]  ([phone]).          MACRO:  None      Signed by: Jessica Moya 9/5/2024 3:53 PM  Dictation workstation:   [id number]     MR breast bilateral w contrast full protocol  Status: Final result     PACS Images     Show images for MR breast bilateral w contrast full protocol  Signed by    Signed Time Phone Pager   Danii Urbina MD 9/27/2024 11:59 [phone]     Exam Information    Status Exam Begun Exam Ended   Final 9/26/2024 08:03 9/26/2024 08:43     Study Result    Narrative & Impression   Interpreted By:  Danii Urbina,   STUDY:  BI MR BREAST BILATERAL WITH  CONTRAST FULL PROTOCOL;  9/26/2024 8:43 am      ACCESSION NUMBER(S):  [id number]      ORDERING CLINICIAN:  PRECIOUS DOUGLAS      INDICATION:  Worsening left bloody and clear nipple discharge as well as left  nipple itching and mild swelling. Previous core biopsy of a left  breast mass demonstrating an intraductal papilloma which was not  excised.      ,N64.52 Nipple discharge      COMPARISON:  Comparison to MRI 03/24/2021 with correlation to most recent  mammograms 04/26/2024.      TECHNIQUE:  Using a dedicated breast coil, STIR axial and T1-weighted fat  saturation axial images of the breasts were obtained, the latter both  before and after intravenous administration of Gadolinium DTPA. On an  independent workstation, 3-D images were formulated using [email or web address]  including time enhancement curves, subtraction images and MIP images.      Intravenous contrast: 14 ML of Dotarem      FINDINGS:  Density: Scattered fibroglandular tissue.      There is symmetric minimal bilateral background enhancement.      RIGHT BREAST: There is a new probably benign 4 mm enhancing focus in  the subcutaneous tissues of the anterior superomedial quadrant on  slice 144. This is STIR hyperintense. No suspicious mass or nonmass  enhancement is identified.      No axillary or internal mammary lymphadenopathy is appreciated.      LEFT BREAST: Signal void from a tissue marker is identified in the  subareolar region from previous biopsy of an intraductal papilloma.  There is benign lymph node in the deep superomedial quadrant on slice  27. No suspicious mass or nonmass enhancement is identified.      No axillary or internal mammary lymphadenopathy is appreciated.      NON-BREAST FINDINGS:  None.      IMPRESSION:  1. New probably benign right breast focus. Recommendation is for a  six-month follow-up MRI.  2. No explanation identified for the patient's left nipple discharge.  Independent clinical evaluation and management is recommended. No  MRI  evidence of malignancy in the left breast.      BI-RADS CATEGORY:  BI-RADS Category:  3 Probably Benign.  Recommendation:  Short-term Interval Follow-up Imaging.  Recommended Date:  6 Months.  Laterality:  Right.      For any future breast imaging appointments, please call [phone]).          MACRO:  None      Signed by: Danii Urbina 9/27/2024 11:59 AM  Dictation workstation:   [id number]         ASSESSMENT / PLAN   Diagnoses and all orders for this visit:  Intraductal papilloma of left breast  -     Case Request Operating Room: Left Magseed lumpectomy  -     BI US guided breast localization left; Future  Nipple discharge  Mass of upper inner quadrant of right breast          Plan  1.  She had undergone a breast MRI for workup of the nipple drainage.  The MRI demonstrated a 3 mm intraductal mass just behind the areola. A follow-up ultrasound was able to identify this 3 mm mass. Patient subsequently underwent an ultrasound-guided biopsy of the mass with pathology diagnosis of intraductal papilloma.  She elected not to have this excised, and this was monitored every 6 months for 2 years which did not demonstrate any change in the mass.  She then returned back to the office with complaints of recurrent left nipple drainage.  Mammogram and ultrasound were negative.  The MRI was performed, and just showed a signal void where the previous biopsy clip was in the left subareolar region.  A definitive mass was not seen in the left breast.  The patient was offered a full excision of the intraductal papilloma which she declined previously.  However, with a recent increase in the amount of nipple drainage, and having a weeks worth of significant pruritus and clear nipple drainage every 2 to 3 weeks, she is now wants to proceed with a left breast Magseed lumpectomy excision.  Reviewed the benefits and risk of a left Magseed lumpectomy surgery.  Given the location of the mass, she is at risk of developing a  flattened or inverted nipple postoperatively.  Other risk factors included, but not limited to, infection, bleeding, non-resolution of all symptoms, recurrence of the mass, allergic reaction to medication and even death.  She has elected to hold off on surgical excision at this time.  2.  The breast MRI was performed due to recurrent left nipple drainage.  However, the MRI suggested a new right breast mass which was probably benign, and required a 6-month MRI follow-up.  This breast MRI will be scheduled for 6 months, and she will follow-up in the office after the MRI.  3.  Surgery for the left breast Magseed lumpectomy is scheduled for 12/5/2024      Joyce Freeman MD, FACS  [facility]  [address] [place] [address];   [address]  [place]  [address] [phone]

## 2024-11-05 NOTE — TELEPHONE ENCOUNTER
This RN Breast Care Coordinator contacted patient for preoperative outreach to identify barriers, needs, and offer support. Reintroduced myself to patient and role. Patient verbalized having reviewed the surgical plan at her visit with Dr. Freeman, and is aware this includes placement of a magseed preoperatively. After patient scheduling preferences and availability were reviewed, patient accepts 11/27 10:15AM for visit. Discussed what to expect before, during, and after the procedure. Patient questions answered as needed. Patient verbalized understanding. Encouraged patient to call with additional questions, concerns, or support needs before concluding our call.

## 2024-11-06 DIAGNOSIS — D24.2 INTRADUCTAL PAPILLOMA OF LEFT BREAST: Primary | ICD-10-CM

## 2024-11-08 ENCOUNTER — APPOINTMENT (OUTPATIENT)
Dept: DERMATOLOGY | Facility: CLINIC | Age: 64
End: 2024-11-08
Payer: COMMERCIAL

## 2024-11-08 DIAGNOSIS — L82.1 SEBORRHEIC KERATOSIS: Primary | ICD-10-CM

## 2024-11-08 DIAGNOSIS — Z12.83 ENCOUNTER FOR SCREENING FOR MALIGNANT NEOPLASM OF SKIN: ICD-10-CM

## 2024-11-08 DIAGNOSIS — D18.01 HEMANGIOMA OF SKIN: ICD-10-CM

## 2024-11-08 DIAGNOSIS — L81.4 LENTIGO: ICD-10-CM

## 2024-11-08 PROCEDURE — 1160F RVW MEDS BY RX/DR IN RCRD: CPT | Performed by: NURSE PRACTITIONER

## 2024-11-08 PROCEDURE — 1159F MED LIST DOCD IN RCRD: CPT | Performed by: NURSE PRACTITIONER

## 2024-11-08 PROCEDURE — 99213 OFFICE O/P EST LOW 20 MIN: CPT | Performed by: NURSE PRACTITIONER

## 2024-11-08 PROCEDURE — G2211 COMPLEX E/M VISIT ADD ON: HCPCS | Performed by: NURSE PRACTITIONER

## 2024-11-08 PROCEDURE — 1036F TOBACCO NON-USER: CPT | Performed by: NURSE PRACTITIONER

## 2024-11-08 NOTE — PROGRESS NOTES
Subjective     Marielena Vides is a 64 y.o. female who presents for the following: Skin Check (Presents for FBSE. No hx of skin cancer. Denies lesions of concern. ).     Review of Systems:  No other skin or systemic complaints other than what is documented elsewhere in the note.    The following portions of the chart were reviewed this encounter and updated as appropriate:  Tobacco  Allergies  Meds  Problems  Med Hx  Surg Hx  Fam Hx         Skin Cancer History  No skin cancer on file.      Specialty Problems          Dermatology Problems    Irritant contact dermatitis, unspecified cause    Melanocytic nevi of unspecified upper limb, including shoulder    Hemangioma of skin and subcutaneous tissue    Melanocytic nevi of trunk    Melanocytic nevi of unspecified lower limb, including hip        Objective   Well appearing patient in no apparent distress; mood and affect are within normal limits.    A full examination was performed including scalp, head, eyes, ears, nose, lips, neck, chest, axillae, abdomen, back, buttocks, bilateral upper extremities, bilateral lower extremities, hands, feet, fingers, toes, fingernails, and toenails. All findings within normal limits unless otherwise noted below.    Assessment/Plan   1. Seborrheic keratosis  Stuck on verrucous, tan-brown papules and plaques.      Although Seborrheic Keratoses can be troublesome and unsightly, they are entirely benign.  Removal of Seborrheic Keratoses is considered a cosmetic procedure. Removal is typically performed using liquid nitrogen cryotherapy.  Treatment of current lesions does not prevent the development of new Seborrheic Keratoses in the future.    2. Encounter for screening for malignant neoplasm of skin  Scattered benign lesions    - Protective measures, such as avoiding skin exposure to sunlight during peak sun hours (10 AM to 3 PM), wearing protective clothing, and applying high-SPF sunscreen, are essential for reducing exposure to  harmful ultraviolet (UV) light.  - Monthly self-examination of the skin is helpful to detect new lesions or changes in existing lesions.  - Discussed signs and symptoms of sun-related skin cancers.   - Make sure your moles are not signs of skin cancer (melanoma). Remember the ABCDEs of melanoma lesions:  A - Asymmetry: One half of the lesion does not mirror the other half.  B - Border: The borders are irregular or vague (indistinct).  C - Color: More than one color may be noted within the mole.  D - Diameter: Size greater than 6 mm (roughly the size of a pencil eraser) may be concerning.  E - Evolving: Notable changes in the lesion over time are suspicious signs for skin cancer.    Related Procedures  Follow Up In Dermatology - Established Patient    3. Hemangioma of skin  Violaceous/red papule with maroon lagoons     - A cherry hemangioma is a small macule (small, flat, smooth area) or papule (small, solid bump) formed from an overgrowth of tiny blood vessels in the skin. Cherry hemangiomas are characteristically red or purplish in color. They often first appear in middle adulthood and usually increase in number with age. Cherry hemangiomas are noncancerous (benign) and are common in adults.  - Lesions are benign, reassured patient.     4. Lentigo  Scattered tan macules in sun-exposed areas.    A solar lentigo (plural, solar lentigines), sometimes called an age spot or liver spot, is a brown macule (small, flat, smooth area of skin) caused by chronic sun or artificial ultraviolet (UV) light exposure. There may be just one lentigo or there may be multiple. This type of lentigo is different from lentigo simplex (discussed separately) because it is caused by exposure to UV light. Solar lentigines are benign, but they do indicate excessive sun exposure, a risk factor for the development of skin cancer.  Lesions are benign, no treatment needed.     Follow up in 12 months for a total body skin exam. Please call me if  there are any changes or development of concerning symptoms (lesion/skin condition is changing, bleeding, enlarging, or worsening).

## 2024-11-15 ENCOUNTER — ANESTHESIA EVENT (OUTPATIENT)
Dept: OPERATING ROOM | Facility: HOSPITAL | Age: 64
End: 2024-11-15
Payer: COMMERCIAL

## 2024-11-26 ENCOUNTER — TELEPHONE (OUTPATIENT)
Dept: RADIOLOGY | Facility: HOSPITAL | Age: 64
End: 2024-11-26
Payer: COMMERCIAL

## 2024-11-27 ENCOUNTER — PRE-ADMISSION TESTING (OUTPATIENT)
Dept: PREADMISSION TESTING | Facility: HOSPITAL | Age: 64
End: 2024-11-27
Payer: COMMERCIAL

## 2024-11-27 ENCOUNTER — TELEPHONE (OUTPATIENT)
Dept: SURGERY | Facility: HOSPITAL | Age: 64
End: 2024-11-27
Payer: COMMERCIAL

## 2024-11-27 ENCOUNTER — HOSPITAL ENCOUNTER (OUTPATIENT)
Dept: RADIOLOGY | Facility: HOSPITAL | Age: 64
Discharge: HOME | End: 2024-11-27
Payer: COMMERCIAL

## 2024-11-27 VITALS
HEIGHT: 64 IN | SYSTOLIC BLOOD PRESSURE: 110 MMHG | DIASTOLIC BLOOD PRESSURE: 64 MMHG | BODY MASS INDEX: 26.65 KG/M2 | TEMPERATURE: 97 F | RESPIRATION RATE: 20 BRPM | HEART RATE: 73 BPM | WEIGHT: 156.1 LBS | OXYGEN SATURATION: 99 %

## 2024-11-27 DIAGNOSIS — E03.9 HYPOTHYROIDISM, UNSPECIFIED TYPE: ICD-10-CM

## 2024-11-27 DIAGNOSIS — D24.2 INTRADUCTAL PAPILLOMA OF LEFT BREAST: ICD-10-CM

## 2024-11-27 DIAGNOSIS — N63.20 MASS OF LEFT BREAST, UNSPECIFIED QUADRANT: ICD-10-CM

## 2024-11-27 DIAGNOSIS — I10 ESSENTIAL HYPERTENSION: ICD-10-CM

## 2024-11-27 DIAGNOSIS — R94.31 ABNORMAL EKG: ICD-10-CM

## 2024-11-27 DIAGNOSIS — Z98.84 HISTORY OF BARIATRIC SURGERY: ICD-10-CM

## 2024-11-27 DIAGNOSIS — E16.2 LOW BLOOD SUGAR: ICD-10-CM

## 2024-11-27 DIAGNOSIS — N18.9 CHRONIC KIDNEY DISEASE, UNSPECIFIED CKD STAGE: ICD-10-CM

## 2024-11-27 DIAGNOSIS — Z01.818 PRE-OP EVALUATION: Primary | ICD-10-CM

## 2024-11-27 LAB
ANION GAP SERPL CALC-SCNC: 12 MMOL/L (ref 10–20)
BUN SERPL-MCNC: 18 MG/DL (ref 6–23)
CALCIUM SERPL-MCNC: 9.7 MG/DL (ref 8.6–10.3)
CHLORIDE SERPL-SCNC: 102 MMOL/L (ref 98–107)
CO2 SERPL-SCNC: 29 MMOL/L (ref 21–32)
CREAT SERPL-MCNC: 0.56 MG/DL (ref 0.5–1.05)
EGFRCR SERPLBLD CKD-EPI 2021: >90 ML/MIN/1.73M*2
ERYTHROCYTE [DISTWIDTH] IN BLOOD BY AUTOMATED COUNT: 13.1 % (ref 11.5–14.5)
GLUCOSE SERPL-MCNC: 39 MG/DL (ref 74–99)
HCT VFR BLD AUTO: 43.1 % (ref 36–46)
HGB BLD-MCNC: 14.4 G/DL (ref 12–16)
MCH RBC QN AUTO: 29.9 PG (ref 26–34)
MCHC RBC AUTO-ENTMCNC: 33.4 G/DL (ref 32–36)
MCV RBC AUTO: 90 FL (ref 80–100)
NRBC BLD-RTO: 0 /100 WBCS (ref 0–0)
PLATELET # BLD AUTO: 210 X10*3/UL (ref 150–450)
POTASSIUM SERPL-SCNC: 3.7 MMOL/L (ref 3.5–5.3)
RBC # BLD AUTO: 4.81 X10*6/UL (ref 4–5.2)
SODIUM SERPL-SCNC: 139 MMOL/L (ref 136–145)
WBC # BLD AUTO: 5.3 X10*3/UL (ref 4.4–11.3)

## 2024-11-27 PROCEDURE — 80048 BASIC METABOLIC PNL TOTAL CA: CPT

## 2024-11-27 PROCEDURE — 2780000003 HC OR 278 NO HCPCS

## 2024-11-27 PROCEDURE — 19285 PERQ DEV BREAST 1ST US IMAG: CPT | Mod: LT

## 2024-11-27 PROCEDURE — 85027 COMPLETE CBC AUTOMATED: CPT

## 2024-11-27 PROCEDURE — 36415 COLL VENOUS BLD VENIPUNCTURE: CPT

## 2024-11-27 PROCEDURE — 93005 ELECTROCARDIOGRAM TRACING: CPT

## 2024-11-27 PROCEDURE — 93010 ELECTROCARDIOGRAM REPORT: CPT | Performed by: INTERNAL MEDICINE

## 2024-11-27 PROCEDURE — A4648 IMPLANTABLE TISSUE MARKER: HCPCS

## 2024-11-27 ASSESSMENT — DUKE ACTIVITY SCORE INDEX (DASI)
CAN YOU WALK INDOORS, SUCH AS AROUND YOUR HOUSE: YES
CAN YOU PARTICIPATE IN STRENOUS SPORTS LIKE SWIMMING, SINGLES TENNIS, FOOTBALL, BASKETBALL, OR SKIING: NO
CAN YOU PARTICIPATE IN MODERATE RECREATIONAL ACTIVITIES LIKE GOLF, BOWLING, DANCING, DOUBLES TENNIS OR THROWING A BASEBALL OR FOOTBALL: NO
CAN YOU CLIMB A FLIGHT OF STAIRS OR WALK UP A HILL: YES
CAN YOU WALK A BLOCK OR TWO ON LEVEL GROUND: YES
CAN YOU RUN A SHORT DISTANCE: NO
CAN YOU TAKE CARE OF YOURSELF (EAT, DRESS, BATHE, OR USE TOILET): YES
CAN YOU DO HEAVY WORK AROUND THE HOUSE LIKE SCRUBBING FLOORS OR LIFTING AND MOVING HEAVY FURNITURE: YES
CAN YOU DO LIGHT WORK AROUND THE HOUSE LIKE DUSTING OR WASHING DISHES: YES
CAN YOU DO YARD WORK LIKE RAKING LEAVES, WEEDING OR PUSHING A MOWER: YES
CAN YOU DO MODERATE WORK AROUND THE HOUSE LIKE VACUUMING, SWEEPING FLOORS OR CARRYING GROCERIES: YES

## 2024-11-27 ASSESSMENT — LIFESTYLE VARIABLES: SMOKING_STATUS: NONSMOKER

## 2024-11-27 NOTE — CPM/PAT H&P
CPM/PAT Evaluation       Name: Marielena Vides (Marielena Vides)  /Age: 1960/64 y.o.     In-Person       Chief Complaint: Scheduled for left breast Magseed lumpectomy under general anesthesia per Dr. Sanchez on 24.         Past Medical History:   Diagnosis Date    Abnormal ECG 11.21    Anxiety     Hypertension     Obesity with body mass index 30 or greater 2024    S/P bariatric surgery 2023    Italo en Y bypass in May 2022  Has since lost 80 lbs. Doing well since surgery       Past Surgical History:   Procedure Laterality Date    BREAST BIOPSY Left      SECTION, LOW TRANSVERSE      GASTRIC BYPASS      OTHER SURGICAL HISTORY  2021    Appendectomy    OTHER SURGICAL HISTORY  2021    Cyst excision    OTHER SURGICAL HISTORY  2021    Tonsillectomy    OTHER SURGICAL HISTORY  2021    Oral surgery    OTHER SURGICAL HISTORY  2021     section    OTHER SURGICAL HISTORY  2021    Carpal tunnel surgery    OTHER SURGICAL HISTORY  2021    Ovarian cystectomy    OTHER SURGICAL HISTORY  2021    Elbow surgery       Patient  reports that she is not currently sexually active and has had partner(s) who are male. She reports using the following method of birth control/protection: Post-menopausal.    Family History   Problem Relation Name Age of Onset    Arthritis Mother Ghada Jayson     COPD Mother Ghada Tyler     Heart disease Mother Ghada Tyler     Colon cancer Father Carl Tyler     Heart disease Father Carl Tyler     Hyperlipidemia Father Carl Tyler     Hypertension Father Carl Tyler     Asthma Brother Naman Jayson     Depression Daughter Rosy Vidse     Drug abuse Daughter Rosy Vides        Allergies   Allergen Reactions    Oxaprozin Unknown    Tramadol Unknown    Triamcinolone Acetonide Other    Beta-Blockers (Beta-Adrenergic Blocking Agts) Rash     Fatigue- unable to tolerate even at  low dose       Prior to Admission medications    Medication Sig Start Date End Date Taking? Authorizing Provider   calcium citrate (Calcitrate) 200 mg (950 mg) tablet Take 2.5 tablets (500 mg) by mouth once daily.   Yes Historical Provider, MD   cholecalciferol (Vitamin D-3) 5,000 Units tablet Take 1 tablet (5,000 Units) by mouth once daily.   Yes Historical Provider, MD   cyanocobalamin, vitamin B-12, (Vitamin B-12) 1,000 mcg tablet extended release Take 1 tablet (1,000 mcg) by mouth once daily.   Yes Historical Provider, MD   levothyroxine (Synthroid, Levoxyl) 125 mcg tablet Take 1 tablet (125 mcg) by mouth once daily. 9/20/24  Yes CHINO Israel-CNP   mirabegron (Myrbetriq) 25 mg tablet extended release 24 hr 24 hr tablet Take 1 tablet (25 mg) by mouth once daily. 6/7/24  Yes FRITZ Sierra   montelukast (Singulair) 10 mg tablet Take 1 tablet (10 mg) by mouth once daily. 6/7/24  Yes FRITZ Sierra   multivitamin tablet,chewable Chew 2 tablets once daily. Flintstones vitamin with iron   Yes Historical Provider, MD   sertraline (Zoloft) 25 mg tablet Take 1 tablet (25 mg) by mouth once daily. 6/7/24  Yes FRITZ Sierra   sertraline (Zoloft) 50 mg tablet Take 1 tablet (50 mg) by mouth once daily. Take in addition to 25mg tablet 6/7/24  Yes FRITZ Sierra   valACYclovir (Valtrex) 1 gram tablet TAKE AS DIRECTED  IF NEEDED. 5/14/23  Yes FRITZ Sierra            Visit Vitals  /64   Pulse 73   Temp 36.1 °C (97 °F) (Temporal)   Resp 20       DASI Risk Score      Flowsheet Row Pre-Admission Testing from 11/27/2024 in  [facility]   Can you take care of yourself (eat, dress, bathe, or use toilet)?  2.75 filed at 11/27/2024 0903   Can you walk indoors, such as around your house? 1.75 filed at 11/27/2024 0903   Can you walk a block or two on level ground?  2.75 filed at 11/27/2024 0903   Can you climb a flight of stairs or walk  up a hill? 5.5 filed at 11/27/2024 0903   Can you run a short distance? 0 filed at 11/27/2024 0903   Can you do light work around the house like dusting or washing dishes? 2.7 filed at 11/27/2024 0903   Can you do moderate work around the house like vacuuming, sweeping floors or carrying groceries? 3.5 filed at 11/27/2024 0903   Can you do heavy work around the house like scrubbing floors or lifting and moving heavy furniture?  8 filed at 11/27/2024 0903   Can you do yard work like raking leaves, weeding or pushing a mower? 4.5 filed at 11/27/2024 0903   Can you participate in moderate recreational activities like golf, bowling, dancing, doubles tennis or throwing a baseball or football? 0 filed at 11/27/2024 0903   Can you participate in strenous sports like swimming, singles tennis, football, basketball, or skiing? 0 filed at 11/27/2024 0903          Caprini DVT Assessment      Flowsheet Row Pre-Admission Testing from 11/27/2024 in  [facility]   DVT Score 3 filed at 11/27/2024 0936   Medical Factors --   [+ intraductal papilloma] filed at 11/27/2024 0936   BMI 30 or less filed at 11/27/2024 0936          Modified Frailty Index    No data to display       CHADS2 Stroke Risk  Current as of 24 minutes ago        N/A 3 to 100%: High Risk   2 to < 3%: Medium Risk   0 to < 2%: Low Risk     Last Change: N/A          This score determines the patient's risk of having a stroke if the patient has atrial fibrillation.        This score is not applicable to this patient. Components are not calculated.          Revised Cardiac Risk Index      Flowsheet Row Pre-Admission Testing from 11/27/2024 in  [facility]   High-Risk Surgery (Intraperitoneal, Intrathoracic,Suprainguinal vascular) 0 filed at 11/27/2024 0937   History of ischemic heart disease (History of MI, History of positive exercuse test, Current chest paint considered due to myocardial ischemia, Use of nitrate therapy, ECG with pathological  Q Waves) 0 filed at 11/27/2024 0937   History of congestive heart failure (pulmonary edemia, bilateral rales or S3 gallop, Paroxysmal nocturnal dyspnea, CXR showing pulmonary vascular redistribution) 0 filed at 11/27/2024 0937   History of cerebrovascular disease (Prior TIA or stroke) 0 filed at 11/27/2024 0937   Pre-operative insulin treatment 0 filed at 11/27/2024 0937   Pre-operative creatinine>2 mg/dl 0 filed at 11/27/2024 0937   Revised Cardiac Risk Calculator 0 filed at 11/27/2024 0937          Apfel Simplified Score      Flowsheet Row Pre-Admission Testing from 11/27/2024 in  [facility]   Smoking status 1 filed at 11/27/2024 0938   History of motion sickness or PONV  0 filed at 11/27/2024 0938   Use of postoperative opioids 1 filed at 11/27/2024 0938   Gender - Female 1=Yes filed at 11/27/2024 0938   Apfel Simplified Score Calculator 3 filed at 11/27/2024 0938          Risk Analysis Index Results This Encounter    No data found in the last 10 encounters.       Stop Bang Score      Flowsheet Row Pre-Admission Testing from 11/27/2024 in  [facility]   Do you snore loudly? 0 filed at 11/27/2024 0902   Do you often feel tired or fatigued after your sleep? 0 filed at 11/27/2024 0902   Has anyone ever observed you stop breathing in your sleep? 0 filed at 11/27/2024 0902   Do you have or are you being treated for high blood pressure? 0 filed at 11/27/2024 0902   Recent BMI (Calculated) 26.8 filed at 11/27/2024 0902   Is BMI greater than 35 kg/m2? 0=No filed at 11/27/2024 0902   Age older than 50 years old? 1=Yes filed at 11/27/2024 0902   Is your neck circumference greater than 17 inches (Male) or 16 inches (Female)? 0 filed at 11/27/2024 0902   Gender - Male 0=No filed at 11/27/2024 0902   STOP-BANG Total Score 1 filed at 11/27/2024 0902          Prodigy: High Risk  Total Score: 8              Prodigy Age Score           ARISCAT Score for Postoperative Pulmonary Complications    No data  to display       Nuñez Perioperative Risk for Myocardial Infarction or Cardiac Arrest (ABDOULAYE)    No data to display       Results for orders placed or performed in visit on 11/27/24 (from the past 24 hours)   ECG 12 Lead   Result Value Ref Range    Ventricular Rate 62 BPM    Atrial Rate 61 BPM    MD Interval 177 ms    QRS Duration 96 ms    QT Interval 409 ms    QTC Calculation(Bazett) 416 ms    P Axis 47 degrees    R Axis 32 degrees    T Axis 88 degrees    QRS Count 11 beats    Q Onset 249 ms    T Offset 454 ms    QTC Fredericia 413 ms   CBC   Result Value Ref Range    WBC 5.3 4.4 - 11.3 x10*3/uL    nRBC 0.0 0.0 - 0.0 /100 WBCs    RBC 4.81 4.00 - 5.20 x10*6/uL    Hemoglobin 14.4 12.0 - 16.0 g/dL    Hematocrit 43.1 36.0 - 46.0 %    MCV 90 80 - 100 fL    MCH 29.9 26.0 - 34.0 pg    MCHC 33.4 32.0 - 36.0 g/dL    RDW 13.1 11.5 - 14.5 %    Platelets 210 150 - 450 x10*3/uL   Basic Metabolic Panel   Result Value Ref Range    Glucose 39 (LL) 74 - 99 mg/dL    Sodium 139 136 - 145 mmol/L    Potassium 3.7 3.5 - 5.3 mmol/L    Chloride 102 98 - 107 mmol/L    Bicarbonate 29 21 - 32 mmol/L    Anion Gap 12 10 - 20 mmol/L    Urea Nitrogen 18 6 - 23 mg/dL    Creatinine 0.56 0.50 - 1.05 mg/dL    eGFR >90 >60 mL/min/1.73m*2    Calcium 9.7 8.6 - 10.3 mg/dL       Assessment and Plan:     Other:   Scheduled for left breast Magseed lumpectomy under general anesthesia per Dr. Sanchez on 12/5/24.   CBC, BMP ordered. Reviewed. Patient had a critical blood sugar of 39. I called the patient and spoke to her, she was already at home after having her Magseed placement/PATC visits today and said she was doing well. She had just eaten peanut butter crackers and states she will continue to watch her blood sugar. She states she can't eat a lot at one time, or overly do high sugar foods ever since her gastric bypass. I forwarded the lab results to her PCP TRISTAN Cronin as well for her reference.   EKG shows multifocal PVC's with a few runs of bigeminy.  Rate is 62 bpm. Last EKG we have is from 2015 showing SR. Echo from 2021 is WNL. Notified PCP TRISTAN Cronin of today's EKG results and discussed the case, PCP will note this in patient's record and follow up with her Postoperatively. At this time, she will be ok to continue with surgery.    H&P dated 11/5/24 per Dr. Sanchez. Reviewed.   All surgery instructions reviewed with patient by RN. Verbalized understanding.

## 2024-11-27 NOTE — PREPROCEDURE INSTRUCTIONS
Medication List            Accurate as of November 27, 2024  9:27 AM. Always use your most recent med list.                calcium citrate 200 mg (950 mg) tablet  Commonly known as: Calcitrate     cholecalciferol 5,000 Units tablet  Commonly known as: Vitamin D-3     cyanocobalamin (vitamin B-12) 1,000 mcg tablet extended release  Commonly known as: Vitamin B-12     levothyroxine 125 mcg tablet  Commonly known as: Synthroid, Levoxyl  Take 1 tablet (125 mcg) by mouth once daily.     mirabegron 25 mg tablet extended release 24 hr 24 hr tablet  Commonly known as: Myrbetriq  Take 1 tablet (25 mg) by mouth once daily.     montelukast 10 mg tablet  Commonly known as: Singulair  Take 1 tablet (10 mg) by mouth once daily.     multivitamin tablet,chewable     * sertraline 50 mg tablet  Commonly known as: Zoloft  Take 1 tablet (50 mg) by mouth once daily. Take in addition to 25mg tablet     * sertraline 25 mg tablet  Commonly known as: Zoloft  Take 1 tablet (25 mg) by mouth once daily.     valACYclovir 1 gram tablet  Commonly known as: Valtrex  TAKE AS DIRECTED  IF NEEDED.           * This list has 2 medication(s) that are the same as other medications prescribed for you. Read the directions carefully, and ask your doctor or other care provider to review them with you.                                  NPO Instructions:    Do not eat any food after midnight the night before your surgery/procedure.    Additional Instructions:     Wear  comfortable loose fitting clothing  Do not use moisturizers, creams, lotions or perfume  All jewelry and valuables should be left at home    Must have a    Do not take any medications on the day of surgery  Stop all vitamins one week prior to surgery

## 2024-11-27 NOTE — RESULT ENCOUNTER NOTE
Called patient @ 12:28pm about low blood sugar of 39. Patient states she did well at her Magseed placement and just ate some peanut butter crackers and is feeling well. Patient will keep an eye on her blood sugar.

## 2024-11-29 LAB
ATRIAL RATE: 61 BPM
P AXIS: 47 DEGREES
PR INTERVAL: 177 MS
Q ONSET: 249 MS
QRS COUNT: 11 BEATS
QRS DURATION: 96 MS
QT INTERVAL: 409 MS
QTC CALCULATION(BAZETT): 416 MS
QTC FREDERICIA: 413 MS
R AXIS: 32 DEGREES
T AXIS: 88 DEGREES
T OFFSET: 454 MS
VENTRICULAR RATE: 62 BPM

## 2024-12-04 ENCOUNTER — PREP FOR PROCEDURE (OUTPATIENT)
Dept: SURGERY | Facility: HOSPITAL | Age: 64
End: 2024-12-04
Payer: COMMERCIAL

## 2024-12-04 NOTE — H&P (VIEW-ONLY)
GENERAL SURGERY PREOP H&P     Patient: Marielena Vides    Age: 64 y.o.   Gender: female    MRN: [id number]    PCP: Vivian Cronin, APRN-CNS          SUBJECTIVE         HPI  Marielena is a 64-year-old white female who presents for a left Magseed lumpectomy surgery for an intraductal papilloma identified in the subareolar region of the left breast.  She has had a chronic issue with left breast clear nipple drainage.  In the past, she was found to have a small intraductal papilloma in the left retroareolar region.  Her drainage seemed to decrease after the biopsy was performed, but now it is recurring more frequent.  She states she gets significant itching associated with a slight reddish-brown discoloration at the 7 o'clock position of her left breast.  She usually has to manipulate the left breast to get about a quarter size amount of clear drainage out of the nipple at the 7 o'clock position.  The drainage is about the size of a quarter.  After she expresses the fluid, this makes the pruritus much better.  The whole cycle happens every 2 to 3 weeks.  She has decided to proceed with surgical intervention for the left breast biopsy-proven intraductal papilloma.     Risk factors for breast cancer: 64-year-old white female; menarche at age 9; first live birth at age 18; 1 breast biopsy (left) demonstrating intraductal papilloma; no family history of breast cancer. Father did have colon cancer in his mid 80s. She went through menopause at age 49; she has never used hormone replacement therapy. She did use birth control pills for about 20 to 25 years. This gives her a 5-year Zhane score of 1.4% and a lifetime risk of 6.5% which overall puts her in an slightly less than average risk category. Using the Tyrer-Cuzick Breast cancer risk evaluation tool, this patient's 10-year risk of breast cancer is 2.7% ( average risk is 3.4%) and lifetime risk is 5.6% (average lifetime risk is 7.0%). This puts her in a slightly less than  average risk category for developing breast cancer.     ROS  Review of Systems   Constitutional: no fever,~no chills,~no recent weight gain~and~no recent weight loss.   Eyes: no loss of vision,~no discharge from the eyes,~no itching of the eyes~and~no eye pain.   ENT: no hearing loss,~no neck pain~and~no hoarseness.   Cardiovascular: lower extremity edema, but~no chest pain~and~no palpitations.   Respiratory: dyspnea during exertion, but~no dyspnea~and~no cough.   Breast: nipple discharge, but~no breast mass,~no pain in breast,~no erythema,~no change in breast skin~and~no axillary adenopathy.   Gastrointestinal: no abdominal pain,~no vomiting,~bowel movement frequency normal,~no nausea.   Genitourinary: no dysuria~and~no hematuria.   Musculoskeletal: arthralgias, but~no myalgias.   Integumentary: no rashes~and~no skin lesions.   Neurological: no headache,~no dizziness,~no numbness,~no tingling~and~no limb weakness.   Psychiatric: no anxiety,~no depression~and~no emotional problems.   Endocrine: no heat or cold intolerance~and~no increased thirst.   Hematologic/Lymphatic: no tendency for easy bleeding~and~no tendency for easy bruising.   All other systems have been reviewed and are negative for complaint.      HISTORY      Medical History        Past Medical History:   Diagnosis Date    Abnormal ECG 11.21    Hypertension      Morbid obesity due to excess calories (Multi) 2021    Obesity 2024    Obesity with body mass index 30 or greater 2024    S/P bariatric surgery 2023     Italo en Y bypass in May 2022  Has since lost 80 lbs. Doing well since surgery            Surgical History         Past Surgical History:   Procedure Laterality Date    BREAST BIOPSY Left       SECTION, LOW TRANSVERSE        GASTRIC BYPASS        OTHER SURGICAL HISTORY   2021     Appendectomy    OTHER SURGICAL HISTORY   2021     Cyst excision    OTHER SURGICAL HISTORY   2021     Tonsillectomy     OTHER SURGICAL HISTORY   2021     Oral surgery    OTHER SURGICAL HISTORY   2021      section    OTHER SURGICAL HISTORY   2021     Carpal tunnel surgery    OTHER SURGICAL HISTORY   2021     Ovarian cystectomy    OTHER SURGICAL HISTORY   2021     Elbow surgery    OTHER SURGICAL HISTORY   2022     Gastric bypass surgery            Family History          Family History   Problem Relation Name Age of Onset    Arthritis Mother Ghada Tyler      COPD Mother Ghada Tyler      Heart disease Mother Ghada Tyler      Colon cancer Father Carl Tyler      Heart disease Father Carl Tyler      Hyperlipidemia Father Carl Tyler      Hypertension Father Carl Tyler      Asthma Brother Naman Tyler      Depression Daughter Rosy Vides      Drug abuse Daughter Rosy Vides              Allergies         Allergies   Allergen Reactions    Oxaprozin Unknown    Tramadol Unknown    Triamcinolone Acetonide Other    Beta-Blockers (Beta-Adrenergic Blocking Agts) Rash       Fatigue- unable to tolerate even at low dose            Tobacco Use History   Social History          Tobacco Use   Smoking Status Never   Smokeless Tobacco Never            Social History          Substance and Sexual Activity   Alcohol Use Never         HOME MEDICATIONS       Current Outpatient Medications   Medication Instructions    calcium citrate (CALCITRATE) 500 mg, oral, Daily RT    cholecalciferol (Vitamin D-3) 5,000 Units tablet 1 tablet, oral, Daily    cyanocobalamin, vitamin B-12, (Vitamin B-12) 1,000 mcg tablet extended release 1 tablet, oral, Daily    lactobacillus combination no.4 (Probiotic) 3 billion cell capsule as directed    levothyroxine (SYNTHROID, LEVOXYL) 125 mcg, oral, Daily    mirabegron (MYRBETRIQ) 25 mg, oral, Daily    montelukast (SINGULAIR) 10 mg, oral, Daily    multivitamin tablet,chewable 2 tablets, oral, Daily RT, Flintstones vitamin with  "iron    sertraline (ZOLOFT) 50 mg, oral, Daily, Take in addition to 25mg tablet    sertraline (ZOLOFT) 25 mg, oral, Daily    valACYclovir (Valtrex) 1 gram tablet TAKE AS DIRECTED  IF NEEDED.            OBJECTIVE   Last Recorded Vitals.  Blood pressure 98/62, pulse 57, height 1.62 m (5' 3.78\"), weight 70.9 kg (156 lb 3.2 oz), SpO2 97%.      PHYSICAL EXAM  Physical Exam   Phone visit/previous exam:  General: Well-developed, well-nourished and in no acute distress.  Head: Normocephalic. Atraumatic.  Neck/thyroid: Neck is supple. Normal thyroid without mass. No jugular venous distention.  Eyes: Pupils equal round and reactive to light. Conjunctiva normal.  ENMT: No masses or deformity of external nose. External ears without masses.  Respiratory/Chest: Normal respiratory effort.  Breast: Moderate breasts. Fibrocystic changes of both breasts especially of the lower outer quadrants and upper outer quadrants of each breast. Symmetrical. No palpable abnormality of the right breast. No palpable abnormality of the left breast. Small red skin lesion on the upper outer quadrant of the areola without any associated cellulitis. No expressible clear nipple drainage from the left nipple today. No skin changes.  Lymphatics: No palpable lymphadenopathy of the cervical, supraclavicular or axillary regions.  Cardiovascular: Regular rate and rhythm.   Abdomen: Soft, nontender, nondistended.  Musculoskeletal: Joints and limbs are grossly normal. Normal gait. Normal range of motion of major joints.  Neuro: Oriented to person, place and time. No obvious neurological deficit. Motor strength grossly normal.  Psych: Normal mood and affect.      RESULTS   Labs  SURGICAL PATHOLOGY  Order: [id number]   Status: Final result       Visible to patient: Yes (not seen)    0 Result Notes   important suggestion  Newer results are available. Click to view them now.            Component 3 yr ago   Pathology Report Name [facility]GE [facility]CO ANTONIO                      " "                                                                           Accession #: [id number]            Pathologist:                   MANOLO TRIVEDI MD  Date of Procedure:    4/7/2021  Date Received:          4/7/2021  Date Reported           4/12/2021  Submitting Physician:   PRECIOUS DOUGLAS MD  Location:                      Copy To/Referring/Attending:  PRECIOUS DOUGLAS MD Other External #  MARIA ELENA NGUYEN MD                                                                   FINAL DIAGNOSIS  A.  LEFT BREAST MASS, ULTRASOUND GUIDED CORE NEEDLE BIOPSY:    -- INTRADUCTAL PAPILLOMA.                                                                                                                                                                                                                                                                                                                                                                                                                                                                                     Electronically Signed Out By MANOLO TRIVEDI MD/FIORELLA  By the signature on this report, the individual or group listed as making the  Final Interpretation/Diagnosis certifies that they have reviewed this case.           Clinical History:  LEFT NIPPLE BLOODY DISCHARGE  BREAST MASS, LEFT N63.20    Specimens Submitted As:  A: LEFT BREAST MASS    Gross Description:  Received in formalin, labeled with the patient's name and hospital number and  \"LT breast\", is an irregular/cylindrical segments of yellow-white fatty soft  tissue measuring 1.5 x 0.2 x 0.1 cm.  The specimen is submitted in toto in one  cassette.  DPG    NOTE:  Ischemia time: 11.00.  This specimen was placed into formalin at: Not provided.    dpg/4/8/2021              [facility]  Department of Pathology  [address] [place] [address]    "   CONVERTED FINAL DIAGNOSIS A.  LEFT BREAST MASS, ULTRASOUND GUIDED CORE NEEDLE BIOPSY:    -- INTRADUCTAL PAPILLOMA.            [facility]  mammo bilateral screening tomosynthesis  Status: Final result      PACS Images      Show images for BI mammo bilateral screening tomosynthesis  Signed by     Signed Time Phone Pager   Augie Anderson MD 4/26/2024 09:54 [phone]      Exam Information     Status Exam Begun Exam Ended   Final 4/26/2024 08:22 4/26/2024 08:32      Study Result     Narrative & Impression   Interpreted By:  Augie Anderson,   STUDY:  BI MAMMO BILATERAL SCREENING TOMOSYNTHESIS;  4/26/2024 8:32 am      ACCESSION NUMBER(S):  [id number]      ORDERING CLINICIAN:  PRECIOUS DOUGLAS      INDICATION:  Screening.      COMPARISON:  Multiple prior examinations dating back to 04/07/2021      FINDINGS:  2D and tomosynthesis images were reviewed at 1 mm slice thickness.      Density:  There are areas of scattered fibroglandular tissue.      No suspicious masses or calcifications are identified.      IMPRESSION:  No mammographic evidence of malignancy.      Based on the Tyrer-Cuzick model for breast cancer risk assessment,  the patient's lifetime risk of breast cancer is 4.61%. Patients with  over a 20% lifetime risk of developing breast cancer may benefit from  additional screening with breast MRI or ultrasound. Please note that  this estimate is based on responses provided on the patient  questionnaire. For more information regarding high risk consultation,  please call [phone].      BI-RADS CATEGORY:      BI-RADS Category:  1 Negative.  Recommendation:  Annual Screening.  Recommended Date:  1 Year.  Laterality:  Bilateral.      For any future breast imaging appointments, please call [phone]).          MACRO:  None          Signed by: Augie Anderson 4/26/2024 9:54 AM  Dictation workstation:   [id number]      mammo left diagnostic tomosynthesis  Status: Final result       PACS Images      Show images for BI mammo left diagnostic tomosynthesis  Signed by     Signed Time Phone Pager   Jessica Moya MD 9/05/2024 15:53 [phone]      Exam Information     Status Exam Begun Exam Ended   Final 9/05/2024 15:13 9/05/2024 15:20      Study Result     Narrative & Impression   Interpreted By:  Jessica Moya,   STUDY:  BI MAMMO LEFT DIAGNOSTIC TOMOSYNTHESIS;  9/5/2024 3:20 pm      ACCESSION NUMBER(S):  [id number]      ORDERING CLINICIAN:  PRECIOUS DOUGLAS      INDICATION:  Signs/Symptoms:Recurrent left nipple discharge with history of  intraductal papilloma.      ,N64.52 Nipple discharge          COMPARISON:  Mammography 04/26/2024, 04/20/2023 and 04/19/2022      FINDINGS:  2D and tomosynthesis images were reviewed at 1 mm slice thickness.      Density:  There are areas of scattered fibroglandular tissue.      Biopsy marker in the slightly medial subareolar left breast is  unchanged in position. No surrounding mass is noted and there is no  change in mammographic density or parenchymal contour in the area. No  suspicious masses or calcifications are identified.      IMPRESSION:  No mammographic evidence of malignancy.  No change in appearance in  the area of the previously biopsied papilloma.      BI-RADS CATEGORY:      BI-RADS Category:  2 Benign.  Recommendation:  Annual Screening.  Recommended Date:  6 Months.  Laterality:  Bilateral.      For any future breast imaging appointments, please call [phone]).          MACRO:  None      Signed by: Jessica Moya 9/5/2024 3:53 PM  Dictation workstation:   [id number]      MR breast bilateral w contrast full protocol  Status: Final result      PACS Images      Show images for MR breast bilateral w contrast full protocol  Signed by     Signed Time Phone Pager   Danii Urbina MD 9/27/2024 11:59 [phone]      Exam Information     Status Exam Begun Exam Ended   Final 9/26/2024 08:03 9/26/2024 08:43       Study Result     Narrative & Impression   Interpreted By:  Danii Urbina,   STUDY:  BI MR BREAST BILATERAL WITH CONTRAST FULL PROTOCOL;  9/26/2024 8:43 am      ACCESSION NUMBER(S):  [id number]      ORDERING CLINICIAN:  PRECIOUS DOUGLAS      INDICATION:  Worsening left bloody and clear nipple discharge as well as left  nipple itching and mild swelling. Previous core biopsy of a left  breast mass demonstrating an intraductal papilloma which was not  excised.      ,N64.52 Nipple discharge      COMPARISON:  Comparison to MRI 03/24/2021 with correlation to most recent  mammograms 04/26/2024.      TECHNIQUE:  Using a dedicated breast coil, STIR axial and T1-weighted fat  saturation axial images of the breasts were obtained, the latter both  before and after intravenous administration of Gadolinium DTPA. On an  independent workstation, 3-D images were formulated using [employer]  including time enhancement curves, subtraction images and MIP images.      Intravenous contrast: 14 ML of Dotarem      FINDINGS:  Density: Scattered fibroglandular tissue.      There is symmetric minimal bilateral background enhancement.      RIGHT BREAST: There is a new probably benign 4 mm enhancing focus in  the subcutaneous tissues of the anterior superomedial quadrant on  slice 144. This is STIR hyperintense. No suspicious mass or nonmass  enhancement is identified.      No axillary or internal mammary lymphadenopathy is appreciated.      LEFT BREAST: Signal void from a tissue marker is identified in the  subareolar region from previous biopsy of an intraductal papilloma.  There is benign lymph node in the deep superomedial quadrant on slice  27. No suspicious mass or nonmass enhancement is identified.      No axillary or internal mammary lymphadenopathy is appreciated.      NON-BREAST FINDINGS:  None.      IMPRESSION:  1. New probably benign right breast focus. Recommendation is for a  six-month follow-up MRI.  2. No explanation  identified for the patient's left nipple discharge.  Independent clinical evaluation and management is recommended. No MRI  evidence of malignancy in the left breast.      BI-RADS CATEGORY:  BI-RADS Category:  3 Probably Benign.  Recommendation:  Short-term Interval Follow-up Imaging.  Recommended Date:  6 Months.  Laterality:  Right.      For any future breast imaging appointments, please call [phone]).          MACRO:  None      Signed by: Danii Urbina 9/27/2024 11:59 AM  Dictation workstation:   [id number]            ASSESSMENT / PLAN   Diagnoses and all orders for this visit:  Intraductal papilloma of left breast  -     Case Request Operating Room: Left Magseed lumpectomy  -     BI US guided breast localization left; Future  Nipple discharge  Mass of upper inner quadrant of right breast           Plan  1.  She had undergone a breast MRI for workup of the nipple drainage.  The MRI demonstrated a 3 mm intraductal mass just behind the areola. A follow-up ultrasound was able to identify this 3 mm mass. Patient subsequently underwent an ultrasound-guided biopsy of the mass with pathology diagnosis of intraductal papilloma.  She elected not to have this excised, and this was monitored every 6 months for 2 years which did not demonstrate any change in the mass.  She then returned back to the office with complaints of recurrent left nipple drainage.  Mammogram and ultrasound were negative.  The MRI was performed, and just showed a signal void where the previous biopsy clip was in the left subareolar region.  A definitive mass was not seen in the left breast.  The patient was offered a full excision of the intraductal papilloma which she declined previously.  However, with a recent increase in the amount of nipple drainage, and having a weeks worth of significant pruritus and clear nipple drainage every 2 to 3 weeks, she is now wants to proceed with a left breast Magseed lumpectomy excision.  Reviewed  the benefits and risk of a left Magseed lumpectomy surgery.  Given the location of the mass, she is at risk of developing a flattened or inverted nipple postoperatively.  Other risk factors included, but not limited to, infection, bleeding, non-resolution of all symptoms, recurrence of the mass, allergic reaction to medication and even death.  She has elected to hold off on surgical excision at this time.  2.  The breast MRI was performed due to recurrent left nipple drainage.  However, the MRI suggested a new right breast mass which was probably benign, and required a 6-month MRI follow-up.  This breast MRI will be scheduled for 6 months, and she will follow-up in the office after the MRI.  3.  Surgery for the left breast Magseed lumpectomy is scheduled for 12/5/2024        Joyce Freeman MD, FACS  [facility]  [address] [place] [address];   [address]  [place] [id number] [phone]

## 2024-12-04 NOTE — H&P
GENERAL SURGERY PREOP H&P     Patient: Marielena Vides    Age: 64 y.o.   Gender: female    MRN: [id number]    PCP: Vivian Cronin, APRN-CNS          SUBJECTIVE         HPI  Marielena is a 64-year-old white female who presents for a left Magseed lumpectomy surgery for an intraductal papilloma identified in the subareolar region of the left breast.  She has had a chronic issue with left breast clear nipple drainage.  In the past, she was found to have a small intraductal papilloma in the left retroareolar region.  Her drainage seemed to decrease after the biopsy was performed, but now it is recurring more frequent.  She states she gets significant itching associated with a slight reddish-brown discoloration at the 7 o'clock position of her left breast.  She usually has to manipulate the left breast to get about a quarter size amount of clear drainage out of the nipple at the 7 o'clock position.  The drainage is about the size of a quarter.  After she expresses the fluid, this makes the pruritus much better.  The whole cycle happens every 2 to 3 weeks.  She has decided to proceed with surgical intervention for the left breast biopsy-proven intraductal papilloma.     Risk factors for breast cancer: 64-year-old white female; menarche at age 9; first live birth at age 18; 1 breast biopsy (left) demonstrating intraductal papilloma; no family history of breast cancer. Father did have colon cancer in his mid 80s. She went through menopause at age 49; she has never used hormone replacement therapy. She did use birth control pills for about 20 to 25 years. This gives her a 5-year Zhane score of 1.4% and a lifetime risk of 6.5% which overall puts her in an slightly less than average risk category. Using the Tyrer-Cuzick Breast cancer risk evaluation tool, this patient's 10-year risk of breast cancer is 2.7% ( average risk is 3.4%) and lifetime risk is 5.6% (average lifetime risk is 7.0%). This puts her in a slightly less than  average risk category for developing breast cancer.     ROS  Review of Systems   Constitutional: no fever,~no chills,~no recent weight gain~and~no recent weight loss.   Eyes: no loss of vision,~no discharge from the eyes,~no itching of the eyes~and~no eye pain.   ENT: no hearing loss,~no neck pain~and~no hoarseness.   Cardiovascular: lower extremity edema, but~no chest pain~and~no palpitations.   Respiratory: dyspnea during exertion, but~no dyspnea~and~no cough.   Breast: nipple discharge, but~no breast mass,~no pain in breast,~no erythema,~no change in breast skin~and~no axillary adenopathy.   Gastrointestinal: no abdominal pain,~no vomiting,~bowel movement frequency normal,~no nausea.   Genitourinary: no dysuria~and~no hematuria.   Musculoskeletal: arthralgias, but~no myalgias.   Integumentary: no rashes~and~no skin lesions.   Neurological: no headache,~no dizziness,~no numbness,~no tingling~and~no limb weakness.   Psychiatric: no anxiety,~no depression~and~no emotional problems.   Endocrine: no heat or cold intolerance~and~no increased thirst.   Hematologic/Lymphatic: no tendency for easy bleeding~and~no tendency for easy bruising.   All other systems have been reviewed and are negative for complaint.      HISTORY      Medical History        Past Medical History:   Diagnosis Date    Abnormal ECG 11.21    Hypertension      Morbid obesity due to excess calories (Multi) 2021    Obesity 2024    Obesity with body mass index 30 or greater 2024    S/P bariatric surgery 2023     Italo en Y bypass in May 2022  Has since lost 80 lbs. Doing well since surgery            Surgical History         Past Surgical History:   Procedure Laterality Date    BREAST BIOPSY Left       SECTION, LOW TRANSVERSE        GASTRIC BYPASS        OTHER SURGICAL HISTORY   2021     Appendectomy    OTHER SURGICAL HISTORY   2021     Cyst excision    OTHER SURGICAL HISTORY   2021     Tonsillectomy     OTHER SURGICAL HISTORY   2021     Oral surgery    OTHER SURGICAL HISTORY   2021      section    OTHER SURGICAL HISTORY   2021     Carpal tunnel surgery    OTHER SURGICAL HISTORY   2021     Ovarian cystectomy    OTHER SURGICAL HISTORY   2021     Elbow surgery    OTHER SURGICAL HISTORY   2022     Gastric bypass surgery            Family History          Family History   Problem Relation Name Age of Onset    Arthritis Mother Ghada Tyler      COPD Mother Ghada Tyler      Heart disease Mother Ghada Tyler      Colon cancer Father Carl Tyler      Heart disease Father Carl Tyler      Hyperlipidemia Father Carl Tyler      Hypertension Father Carl Tyler      Asthma Brother Naman Tyler      Depression Daughter Rosy Vides      Drug abuse Daughter Rosy Vides              Allergies         Allergies   Allergen Reactions    Oxaprozin Unknown    Tramadol Unknown    Triamcinolone Acetonide Other    Beta-Blockers (Beta-Adrenergic Blocking Agts) Rash       Fatigue- unable to tolerate even at low dose            Tobacco Use History   Social History          Tobacco Use   Smoking Status Never   Smokeless Tobacco Never            Social History          Substance and Sexual Activity   Alcohol Use Never         HOME MEDICATIONS       Current Outpatient Medications   Medication Instructions    calcium citrate (CALCITRATE) 500 mg, oral, Daily RT    cholecalciferol (Vitamin D-3) 5,000 Units tablet 1 tablet, oral, Daily    cyanocobalamin, vitamin B-12, (Vitamin B-12) 1,000 mcg tablet extended release 1 tablet, oral, Daily    lactobacillus combination no.4 (Probiotic) 3 billion cell capsule as directed    levothyroxine (SYNTHROID, LEVOXYL) 125 mcg, oral, Daily    mirabegron (MYRBETRIQ) 25 mg, oral, Daily    montelukast (SINGULAIR) 10 mg, oral, Daily    multivitamin tablet,chewable 2 tablets, oral, Daily RT, Flintstones vitamin with  "iron    sertraline (ZOLOFT) 50 mg, oral, Daily, Take in addition to 25mg tablet    sertraline (ZOLOFT) 25 mg, oral, Daily    valACYclovir (Valtrex) 1 gram tablet TAKE AS DIRECTED  IF NEEDED.            OBJECTIVE   Last Recorded Vitals.  Blood pressure 98/62, pulse 57, height 1.62 m (5' 3.78\"), weight 70.9 kg (156 lb 3.2 oz), SpO2 97%.      PHYSICAL EXAM  Physical Exam   Phone visit/previous exam:  General: Well-developed, well-nourished and in no acute distress.  Head: Normocephalic. Atraumatic.  Neck/thyroid: Neck is supple. Normal thyroid without mass. No jugular venous distention.  Eyes: Pupils equal round and reactive to light. Conjunctiva normal.  ENMT: No masses or deformity of external nose. External ears without masses.  Respiratory/Chest: Normal respiratory effort.  Breast: Moderate breasts. Fibrocystic changes of both breasts especially of the lower outer quadrants and upper outer quadrants of each breast. Symmetrical. No palpable abnormality of the right breast. No palpable abnormality of the left breast. Small red skin lesion on the upper outer quadrant of the areola without any associated cellulitis. No expressible clear nipple drainage from the left nipple today. No skin changes.  Lymphatics: No palpable lymphadenopathy of the cervical, supraclavicular or axillary regions.  Cardiovascular: Regular rate and rhythm.   Abdomen: Soft, nontender, nondistended.  Musculoskeletal: Joints and limbs are grossly normal. Normal gait. Normal range of motion of major joints.  Neuro: Oriented to person, place and time. No obvious neurological deficit. Motor strength grossly normal.  Psych: Normal mood and affect.      RESULTS   Labs  SURGICAL PATHOLOGY  Order: [id number]   Status: Final result       Visible to patient: Yes (not seen)    0 Result Notes   important suggestion  Newer results are available. Click to view them now.            Component 3 yr ago   Pathology Report Name [facility]ODGE CM [facility]CO ANTONIO                      " "                                                                           Accession #: [id number]            Pathologist:                   MANOLO TRIVEDI MD  Date of Procedure:    4/7/2021  Date Received:          4/7/2021  Date Reported           4/12/2021  Submitting Physician:   PRECIOUS DOUGLAS MD  Location:                      Copy To/Referring/Attending:  PRECIOUS DOUGLAS MD Other External #  MARIA ELENA NGUYEN MD                                                                   FINAL DIAGNOSIS  A.  LEFT BREAST MASS, ULTRASOUND GUIDED CORE NEEDLE BIOPSY:    -- INTRADUCTAL PAPILLOMA.                                                                                                                                                                                                                                                                                                                                                                                                                                                                                     Electronically Signed Out By MANOLO TRIVEDI MD/FIORELLA  By the signature on this report, the individual or group listed as making the  Final Interpretation/Diagnosis certifies that they have reviewed this case.           Clinical History:  LEFT NIPPLE BLOODY DISCHARGE  BREAST MASS, LEFT N63.20    Specimens Submitted As:  A: LEFT BREAST MASS    Gross Description:  Received in formalin, labeled with the patient's name and hospital number and  \"LT breast\", is an irregular/cylindrical segments of yellow-white fatty soft  tissue measuring 1.5 x 0.2 x 0.1 cm.  The specimen is submitted in toto in one  cassette.  DPG    NOTE:  Ischemia time: 11.00.  This specimen was placed into formalin at: Not provided.    dpg/4/8/2021              [facility]  Department of Pathology  [address] [place] [address]    "   CONVERTED FINAL DIAGNOSIS A.  LEFT BREAST MASS, ULTRASOUND GUIDED CORE NEEDLE BIOPSY:    -- INTRADUCTAL PAPILLOMA.            [facility]  mammo bilateral screening tomosynthesis  Status: Final result      PACS Images      Show images for BI mammo bilateral screening tomosynthesis  Signed by     Signed Time Phone Pager   Augie Anderson MD 4/26/2024 09:54 [phone]      Exam Information     Status Exam Begun Exam Ended   Final 4/26/2024 08:22 4/26/2024 08:32      Study Result     Narrative & Impression   Interpreted By:  Augie Anderson,   STUDY:  BI MAMMO BILATERAL SCREENING TOMOSYNTHESIS;  4/26/2024 8:32 am      ACCESSION NUMBER(S):  [id number]      ORDERING CLINICIAN:  PRECIOUS DOUGLAS      INDICATION:  Screening.      COMPARISON:  Multiple prior examinations dating back to 04/07/2021      FINDINGS:  2D and tomosynthesis images were reviewed at 1 mm slice thickness.      Density:  There are areas of scattered fibroglandular tissue.      No suspicious masses or calcifications are identified.      IMPRESSION:  No mammographic evidence of malignancy.      Based on the Tyrer-Cuzick model for breast cancer risk assessment,  the patient's lifetime risk of breast cancer is 4.61%. Patients with  over a 20% lifetime risk of developing breast cancer may benefit from  additional screening with breast MRI or ultrasound. Please note that  this estimate is based on responses provided on the patient  questionnaire. For more information regarding high risk consultation,  please call [phone].      BI-RADS CATEGORY:      BI-RADS Category:  1 Negative.  Recommendation:  Annual Screening.  Recommended Date:  1 Year.  Laterality:  Bilateral.      For any future breast imaging appointments, please call [phone]).          MACRO:  None          Signed by: Augie Anderson 4/26/2024 9:54 AM  Dictation workstation:   [id number]      mammo left diagnostic tomosynthesis  Status: Final result       PACS Images      Show images for BI mammo left diagnostic tomosynthesis  Signed by     Signed Time Phone Pager   Jessica Moya MD 9/05/2024 15:53 [phone]      Exam Information     Status Exam Begun Exam Ended   Final 9/05/2024 15:13 9/05/2024 15:20      Study Result     Narrative & Impression   Interpreted By:  Jessica Moya,   STUDY:  BI MAMMO LEFT DIAGNOSTIC TOMOSYNTHESIS;  9/5/2024 3:20 pm      ACCESSION NUMBER(S):  [id number]      ORDERING CLINICIAN:  PRECIOUS DOUGLAS      INDICATION:  Signs/Symptoms:Recurrent left nipple discharge with history of  intraductal papilloma.      ,N64.52 Nipple discharge          COMPARISON:  Mammography 04/26/2024, 04/20/2023 and 04/19/2022      FINDINGS:  2D and tomosynthesis images were reviewed at 1 mm slice thickness.      Density:  There are areas of scattered fibroglandular tissue.      Biopsy marker in the slightly medial subareolar left breast is  unchanged in position. No surrounding mass is noted and there is no  change in mammographic density or parenchymal contour in the area. No  suspicious masses or calcifications are identified.      IMPRESSION:  No mammographic evidence of malignancy.  No change in appearance in  the area of the previously biopsied papilloma.      BI-RADS CATEGORY:      BI-RADS Category:  2 Benign.  Recommendation:  Annual Screening.  Recommended Date:  6 Months.  Laterality:  Bilateral.      For any future breast imaging appointments, please call [phone]).          MACRO:  None      Signed by: Jessica Moya 9/5/2024 3:53 PM  Dictation workstation:   [id number]      MR breast bilateral w contrast full protocol  Status: Final result      PACS Images      Show images for MR breast bilateral w contrast full protocol  Signed by     Signed Time Phone Pager   Danii Urbina MD 9/27/2024 11:59 [phone]      Exam Information     Status Exam Begun Exam Ended   Final 9/26/2024 08:03 9/26/2024 08:43       Study Result     Narrative & Impression   Interpreted By:  Danii Urbina,   STUDY:  BI MR BREAST BILATERAL WITH CONTRAST FULL PROTOCOL;  9/26/2024 8:43 am      ACCESSION NUMBER(S):  [id number]      ORDERING CLINICIAN:  PRECIOUS DOUGLAS      INDICATION:  Worsening left bloody and clear nipple discharge as well as left  nipple itching and mild swelling. Previous core biopsy of a left  breast mass demonstrating an intraductal papilloma which was not  excised.      ,N64.52 Nipple discharge      COMPARISON:  Comparison to MRI 03/24/2021 with correlation to most recent  mammograms 04/26/2024.      TECHNIQUE:  Using a dedicated breast coil, STIR axial and T1-weighted fat  saturation axial images of the breasts were obtained, the latter both  before and after intravenous administration of Gadolinium DTPA. On an  independent workstation, 3-D images were formulated using Seamless  including time enhancement curves, subtraction images and MIP images.      Intravenous contrast: 14 ML of Dotarem      FINDINGS:  Density: Scattered fibroglandular tissue.      There is symmetric minimal bilateral background enhancement.      RIGHT BREAST: There is a new probably benign 4 mm enhancing focus in  the subcutaneous tissues of the anterior superomedial quadrant on  slice 144. This is STIR hyperintense. No suspicious mass or nonmass  enhancement is identified.      No axillary or internal mammary lymphadenopathy is appreciated.      LEFT BREAST: Signal void from a tissue marker is identified in the  subareolar region from previous biopsy of an intraductal papilloma.  There is benign lymph node in the deep superomedial quadrant on slice  27. No suspicious mass or nonmass enhancement is identified.      No axillary or internal mammary lymphadenopathy is appreciated.      NON-BREAST FINDINGS:  None.      IMPRESSION:  1. New probably benign right breast focus. Recommendation is for a  six-month follow-up MRI.  2. No explanation  identified for the patient's left nipple discharge.  Independent clinical evaluation and management is recommended. No MRI  evidence of malignancy in the left breast.      BI-RADS CATEGORY:  BI-RADS Category:  3 Probably Benign.  Recommendation:  Short-term Interval Follow-up Imaging.  Recommended Date:  6 Months.  Laterality:  Right.      For any future breast imaging appointments, please call [phone]).          MACRO:  None      Signed by: Danii Urbina 9/27/2024 11:59 AM  Dictation workstation:   [id number]            ASSESSMENT / PLAN   Diagnoses and all orders for this visit:  Intraductal papilloma of left breast  -     Case Request Operating Room: Left Magseed lumpectomy  -     BI US guided breast localization left; Future  Nipple discharge  Mass of upper inner quadrant of right breast           Plan  1.  She had undergone a breast MRI for workup of the nipple drainage.  The MRI demonstrated a 3 mm intraductal mass just behind the areola. A follow-up ultrasound was able to identify this 3 mm mass. Patient subsequently underwent an ultrasound-guided biopsy of the mass with pathology diagnosis of intraductal papilloma.  She elected not to have this excised, and this was monitored every 6 months for 2 years which did not demonstrate any change in the mass.  She then returned back to the office with complaints of recurrent left nipple drainage.  Mammogram and ultrasound were negative.  The MRI was performed, and just showed a signal void where the previous biopsy clip was in the left subareolar region.  A definitive mass was not seen in the left breast.  The patient was offered a full excision of the intraductal papilloma which she declined previously.  However, with a recent increase in the amount of nipple drainage, and having a weeks worth of significant pruritus and clear nipple drainage every 2 to 3 weeks, she is now wants to proceed with a left breast Magseed lumpectomy excision.  Reviewed  the benefits and risk of a left Magseed lumpectomy surgery.  Given the location of the mass, she is at risk of developing a flattened or inverted nipple postoperatively.  Other risk factors included, but not limited to, infection, bleeding, non-resolution of all symptoms, recurrence of the mass, allergic reaction to medication and even death.  She has elected to hold off on surgical excision at this time.  2.  The breast MRI was performed due to recurrent left nipple drainage.  However, the MRI suggested a new right breast mass which was probably benign, and required a 6-month MRI follow-up.  This breast MRI will be scheduled for 6 months, and she will follow-up in the office after the MRI.  3.  Surgery for the left breast Magseed lumpectomy is scheduled for 12/5/2024        Joyce Freeman MD, FACS  [facility] General Surgery  [address] [place] [address];   [address]; [address]  [place] [id number] [phone]

## 2024-12-05 ENCOUNTER — HOSPITAL ENCOUNTER (OUTPATIENT)
Facility: HOSPITAL | Age: 64
Setting detail: OUTPATIENT SURGERY
Discharge: HOME | End: 2024-12-05
Attending: SURGERY | Admitting: SURGERY
Payer: COMMERCIAL

## 2024-12-05 ENCOUNTER — PHARMACY VISIT (OUTPATIENT)
Dept: PHARMACY | Facility: CLINIC | Age: 64
End: 2024-12-05
Payer: COMMERCIAL

## 2024-12-05 ENCOUNTER — ANESTHESIA (OUTPATIENT)
Dept: OPERATING ROOM | Facility: HOSPITAL | Age: 64
End: 2024-12-05
Payer: COMMERCIAL

## 2024-12-05 ENCOUNTER — APPOINTMENT (OUTPATIENT)
Dept: RADIOLOGY | Facility: HOSPITAL | Age: 64
End: 2024-12-05
Payer: COMMERCIAL

## 2024-12-05 VITALS
DIASTOLIC BLOOD PRESSURE: 68 MMHG | WEIGHT: 152 LBS | OXYGEN SATURATION: 99 % | BODY MASS INDEX: 25.95 KG/M2 | HEART RATE: 62 BPM | TEMPERATURE: 97.2 F | RESPIRATION RATE: 13 BRPM | SYSTOLIC BLOOD PRESSURE: 142 MMHG | HEIGHT: 64 IN

## 2024-12-05 DIAGNOSIS — R92.8 ABNORMAL MAMMOGRAM OF LEFT BREAST: ICD-10-CM

## 2024-12-05 DIAGNOSIS — D24.2 INTRADUCTAL PAPILLOMA OF LEFT BREAST: Primary | ICD-10-CM

## 2024-12-05 PROCEDURE — 2500000005 HC RX 250 GENERAL PHARMACY W/O HCPCS: Performed by: NURSE ANESTHETIST, CERTIFIED REGISTERED

## 2024-12-05 PROCEDURE — 7100000001 HC RECOVERY ROOM TIME - INITIAL BASE CHARGE: Performed by: SURGERY

## 2024-12-05 PROCEDURE — 19125 EXCISION BREAST LESION: CPT | Performed by: SURGERY

## 2024-12-05 PROCEDURE — 3600000007 HC OR TIME - EACH INCREMENTAL 1 MINUTE - PROCEDURE LEVEL TWO: Performed by: SURGERY

## 2024-12-05 PROCEDURE — 88307 TISSUE EXAM BY PATHOLOGIST: CPT | Performed by: PATHOLOGY

## 2024-12-05 PROCEDURE — 2500000005 HC RX 250 GENERAL PHARMACY W/O HCPCS: Performed by: SURGERY

## 2024-12-05 PROCEDURE — 7100000002 HC RECOVERY ROOM TIME - EACH INCREMENTAL 1 MINUTE: Performed by: SURGERY

## 2024-12-05 PROCEDURE — 2500000004 HC RX 250 GENERAL PHARMACY W/ HCPCS (ALT 636 FOR OP/ED): Performed by: NURSE ANESTHETIST, CERTIFIED REGISTERED

## 2024-12-05 PROCEDURE — 7100000010 HC PHASE TWO TIME - EACH INCREMENTAL 1 MINUTE: Performed by: SURGERY

## 2024-12-05 PROCEDURE — 3600000002 HC OR TIME - INITIAL BASE CHARGE - PROCEDURE LEVEL TWO: Performed by: SURGERY

## 2024-12-05 PROCEDURE — 76098 X-RAY EXAM SURGICAL SPECIMEN: CPT

## 2024-12-05 PROCEDURE — 7100000009 HC PHASE TWO TIME - INITIAL BASE CHARGE: Performed by: SURGERY

## 2024-12-05 PROCEDURE — 2500000001 HC RX 250 WO HCPCS SELF ADMINISTERED DRUGS (ALT 637 FOR MEDICARE OP): Performed by: ANESTHESIOLOGY

## 2024-12-05 PROCEDURE — 3700000002 HC GENERAL ANESTHESIA TIME - EACH INCREMENTAL 1 MINUTE: Performed by: SURGERY

## 2024-12-05 PROCEDURE — 3700000001 HC GENERAL ANESTHESIA TIME - INITIAL BASE CHARGE: Performed by: SURGERY

## 2024-12-05 PROCEDURE — 2500000004 HC RX 250 GENERAL PHARMACY W/ HCPCS (ALT 636 FOR OP/ED): Performed by: ANESTHESIOLOGY

## 2024-12-05 PROCEDURE — 88307 TISSUE EXAM BY PATHOLOGIST: CPT | Mod: TC,PORLAB | Performed by: SURGERY

## 2024-12-05 PROCEDURE — RXMED WILLOW AMBULATORY MEDICATION CHARGE

## 2024-12-05 PROCEDURE — 96372 THER/PROPH/DIAG INJ SC/IM: CPT | Performed by: NURSE ANESTHETIST, CERTIFIED REGISTERED

## 2024-12-05 PROCEDURE — 2720000007 HC OR 272 NO HCPCS: Performed by: SURGERY

## 2024-12-05 PROCEDURE — 76098 X-RAY EXAM SURGICAL SPECIMEN: CPT | Performed by: RADIOLOGY

## 2024-12-05 PROCEDURE — 2500000004 HC RX 250 GENERAL PHARMACY W/ HCPCS (ALT 636 FOR OP/ED): Mod: JZ | Performed by: SURGERY

## 2024-12-05 RX ORDER — PROPOFOL 10 MG/ML
INJECTION, EMULSION INTRAVENOUS AS NEEDED
Status: DISCONTINUED | OUTPATIENT
Start: 2024-12-05 | End: 2024-12-05

## 2024-12-05 RX ORDER — SODIUM CHLORIDE 9 MG/ML
20 INJECTION, SOLUTION INTRAVENOUS CONTINUOUS
Status: DISCONTINUED | OUTPATIENT
Start: 2024-12-05 | End: 2024-12-05 | Stop reason: HOSPADM

## 2024-12-05 RX ORDER — ALBUTEROL SULFATE 0.83 MG/ML
2.5 SOLUTION RESPIRATORY (INHALATION) ONCE
Status: DISCONTINUED | OUTPATIENT
Start: 2024-12-05 | End: 2024-12-05 | Stop reason: HOSPADM

## 2024-12-05 RX ORDER — GLYCOPYRROLATE 0.2 MG/ML
INJECTION INTRAMUSCULAR; INTRAVENOUS AS NEEDED
Status: DISCONTINUED | OUTPATIENT
Start: 2024-12-05 | End: 2024-12-05

## 2024-12-05 RX ORDER — BUPIVACAINE HYDROCHLORIDE AND EPINEPHRINE 5; 5 MG/ML; UG/ML
INJECTION, SOLUTION EPIDURAL; INTRACAUDAL; PERINEURAL AS NEEDED
Status: DISCONTINUED | OUTPATIENT
Start: 2024-12-05 | End: 2024-12-05 | Stop reason: HOSPADM

## 2024-12-05 RX ORDER — HYDROMORPHONE HYDROCHLORIDE 0.2 MG/ML
0.2 INJECTION INTRAMUSCULAR; INTRAVENOUS; SUBCUTANEOUS EVERY 5 MIN PRN
Status: DISCONTINUED | OUTPATIENT
Start: 2024-12-05 | End: 2024-12-05 | Stop reason: HOSPADM

## 2024-12-05 RX ORDER — NORETHINDRONE AND ETHINYL ESTRADIOL 0.5-0.035
KIT ORAL AS NEEDED
Status: DISCONTINUED | OUTPATIENT
Start: 2024-12-05 | End: 2024-12-05

## 2024-12-05 RX ORDER — CEFAZOLIN SODIUM 2 G/100ML
2 INJECTION, SOLUTION INTRAVENOUS ONCE
Status: COMPLETED | OUTPATIENT
Start: 2024-12-05 | End: 2024-12-05

## 2024-12-05 RX ORDER — MIDAZOLAM HYDROCHLORIDE 1 MG/ML
INJECTION, SOLUTION INTRAMUSCULAR; INTRAVENOUS AS NEEDED
Status: DISCONTINUED | OUTPATIENT
Start: 2024-12-05 | End: 2024-12-05

## 2024-12-05 RX ORDER — ACETAMINOPHEN 325 MG/1
975 TABLET ORAL ONCE
Status: COMPLETED | OUTPATIENT
Start: 2024-12-05 | End: 2024-12-05

## 2024-12-05 RX ORDER — ONDANSETRON HYDROCHLORIDE 2 MG/ML
4 INJECTION, SOLUTION INTRAVENOUS ONCE
Status: COMPLETED | OUTPATIENT
Start: 2024-12-05 | End: 2024-12-05

## 2024-12-05 RX ORDER — HYDROCODONE BITARTRATE AND ACETAMINOPHEN 7.5; 325 MG/1; MG/1
1 TABLET ORAL EVERY 6 HOURS PRN
Qty: 10 TABLET | Refills: 0 | Status: SHIPPED | OUTPATIENT
Start: 2024-12-05

## 2024-12-05 RX ORDER — ONDANSETRON HYDROCHLORIDE 2 MG/ML
4 INJECTION, SOLUTION INTRAVENOUS ONCE AS NEEDED
Status: COMPLETED | OUTPATIENT
Start: 2024-12-05 | End: 2024-12-05

## 2024-12-05 RX ORDER — KETOROLAC TROMETHAMINE 30 MG/ML
INJECTION, SOLUTION INTRAMUSCULAR; INTRAVENOUS AS NEEDED
Status: DISCONTINUED | OUTPATIENT
Start: 2024-12-05 | End: 2024-12-05

## 2024-12-05 RX ORDER — FENTANYL CITRATE 50 UG/ML
INJECTION, SOLUTION INTRAMUSCULAR; INTRAVENOUS AS NEEDED
Status: DISCONTINUED | OUTPATIENT
Start: 2024-12-05 | End: 2024-12-05

## 2024-12-05 RX ORDER — FAMOTIDINE 10 MG/ML
20 INJECTION INTRAVENOUS ONCE
Status: COMPLETED | OUTPATIENT
Start: 2024-12-05 | End: 2024-12-05

## 2024-12-05 RX ORDER — METOCLOPRAMIDE HYDROCHLORIDE 5 MG/ML
10 INJECTION INTRAMUSCULAR; INTRAVENOUS ONCE
Status: COMPLETED | OUTPATIENT
Start: 2024-12-05 | End: 2024-12-05

## 2024-12-05 RX ORDER — SODIUM CITRATE AND CITRIC ACID MONOHYDRATE 334; 500 MG/5ML; MG/5ML
30 SOLUTION ORAL ONCE
Status: COMPLETED | OUTPATIENT
Start: 2024-12-05 | End: 2024-12-05

## 2024-12-05 ASSESSMENT — PAIN SCALES - GENERAL
PAINLEVEL_OUTOF10: 0 - NO PAIN
PAIN_LEVEL: 0
PAINLEVEL_OUTOF10: 0 - NO PAIN
PAINLEVEL_OUTOF10: 0 - NO PAIN

## 2024-12-05 ASSESSMENT — PAIN - FUNCTIONAL ASSESSMENT
PAIN_FUNCTIONAL_ASSESSMENT: 0-10
PAIN_FUNCTIONAL_ASSESSMENT: 0-10

## 2024-12-05 ASSESSMENT — COLUMBIA-SUICIDE SEVERITY RATING SCALE - C-SSRS
2. HAVE YOU ACTUALLY HAD ANY THOUGHTS OF KILLING YOURSELF?: NO
6. HAVE YOU EVER DONE ANYTHING, STARTED TO DO ANYTHING, OR PREPARED TO DO ANYTHING TO END YOUR LIFE?: NO
1. IN THE PAST MONTH, HAVE YOU WISHED YOU WERE DEAD OR WISHED YOU COULD GO TO SLEEP AND NOT WAKE UP?: NO

## 2024-12-05 NOTE — OP NOTE
Left Magseed lumpectomy (L) Operative Note     Date: 2024  OR Location: [facility] OR    Name: Marielena Vides : 1960, Age: 64 y.o., MRN: [id number], Sex: female    Diagnosis  Pre-op Diagnosis      * Intraductal papilloma of left breast [D24.2] Post-op Diagnosis     * Intraductal papilloma of left breast [D24.2]     Procedures  Left Magseed lumpectomy   - KY EXC BREAST LES PREOP PLMT RAD MARKER OPEN 1 LES      Surgeons      * Joyce Freeman - Primary    Resident/Fellow/Other Assistant:  River Villalba DO    Staff:   Circulator: Lauren Hollis Person: Nory  Surgical Assistant:     Anesthesia Staff: CRNA: CHINO Urrutia-MIKIE    Procedure Summary  Anesthesia: Anesthesia type not filed in the log.  ASA: ASA status not filed in the log.  Estimated Blood Loss: 2mL  Intra-op Medications:   Administrations occurring from 0745 to 0915 on 24:   Medication Name Total Dose   BUPivacaine-EPINEPHrine (PF) (Marcaine w/EPI) 0.5 %-1:200,000 injection 10 mL   fentaNYL (Sublimaze) injection 50 mcg/mL 100 mcg   midazolam (Versed) injection 1 mg/mL 2 mg   propofol (Diprivan) injection 10 mg/mL 200 mg   ceFAZolin (Ancef) 2 g in dextrose (iso)  mL 2 g              Anesthesia Record               Intraprocedure I/O Totals       None           Specimen:   ID Type Source Tests Collected by Time   1 : LEFT BREAST MASS SHORT STITCH SUPERIOR, LONG STITCH LATERAL, TWO ANTERIOR Tissue BREAST LUMPECTOMY LEFT SURGICAL PATHOLOGY EXAM Joyce Freeman MD 2024 0840                 Drains and/or Catheters: * None in log *    Tourniquet Times:         Implants:     Findings: Radiographic evaluation of the specimen showed both the biopsy clip and the Magseed in the epicenter.    Indications: Marielena Vides is an 64 y.o. female who is having surgery for Intraductal papilloma of left breast [D24.2].  She originally presented with intermittent left nipple drainage.  She undergone radiographic evaluation was  ultimately found to have a small mass directly behind the nipple.  Core needle biopsy of this demonstrated an intraductal papilloma.  After the biopsy her nipple drainage initially stopped, but then she started to have recurrent symptoms.  Excision of the intraductal papilloma was recommended to stop the nipple drainage.  She underwent Magseed placement and now presents for surgical excision.    The patient was seen in the preoperative area. The risks, benefits, complications, treatment options, non-operative alternatives, expected recovery and outcomes were discussed with the patient. The possibilities of reaction to medication, pulmonary aspiration, injury to surrounding structures, bleeding, recurrent infection, the need for additional procedures, failure to diagnose a condition, and creating a complication requiring transfusion or operation were discussed with the patient. The patient concurred with the proposed plan, giving informed consent.  The site of surgery was properly noted/marked if necessary per policy. The patient has been actively warmed in preoperative area. Preoperative antibiotics have been ordered and given within 1 hours of incision. Venous thrombosis prophylaxis have been ordered including bilateral sequential compression devices    Procedure Details:   She was brought into the operating room and was laid in the supine position.  After placement under general anesthesia, knee-high DOTTIE hose and SCDs were placed on bilateral lower extremities.  The left breast then was prepped with ChloraPrep and draped in usual sterile fashion.  A pause was taken the correct patient procedure were identified.    Using the Magseed probe, the Magseed was identified directly behind the nipple area.  Per radiographic evaluation, the Magseed appeared to be approximately 5 mm from the skin level.  At this time the inferior border of the areola was locally anesthetized with half percent Marcaine with epinephrine.  A 3  cm curvilinear incision was made along the inferior border of the areola.  This incision was carried in a superior fashion to make a skin flap that extended underneath the nipple area.  This way the Magseed and clip would fall away from the skin to allow for a better lumpectomy excision.  Once the dissection went just beyond the nipple, the Magseed probe was used again to identify the Magseed within the breast tissue.  This area was grasped with an Allis clamp.  A standard lumpectomy was performed using cautery.  The Magseed probe was used multiple times to make sure that the excision was well below the level of the Magseed.  Once the specimen was removed, it was marked with a short stitch superior, long stitch lateral and 2 stitches anteriorly.  Radiographic evaluation of the specimen showed both the clip and the Magseed within the epicenter of the specimen.  At this time the cavity was checked for hemostasis.  The superficial breast tissue then was closed using interrupted stitches of 2-0 Vicryl suture.  The skin was reapproximated using a running subcuticular stitch of 4-0 Vicryl suture.  The wound then was dressed with Steri-Strips, 4 x 4's and tape.  A standard postoperative bra then was placed.  She was awoken from anesthesia and taken to the recovery room in stable condition.    Counts: Correct x 2  Complications: None.  Drains: None.    Complications:  None; patient tolerated the procedure well.    Disposition: PACU - hemodynamically stable.  Condition: stable           Additional Details:     Attending Attestation: I was present for the entire procedure.    Joyce Freeman  Phone Number: [phone]

## 2024-12-05 NOTE — ANESTHESIA PREPROCEDURE EVALUATION
Patient: Marielena Vides    Procedure Information       Date/Time: 12/05/24 0745    Procedure: Left Magseed lumpectomy (Left) - 8am, 1 hour, MAGSEED    Location: [facility] / [facility]    Surgeons: Joyce Freeman MD            Relevant Problems   Cardiac   (+) Asymptomatic PVCs   (+) Essential hypertension   (+) Hyperlipidemia      Pulmonary   (+) Asthma   (+) Mild intermittent extrinsic asthma (HHS-HCC)      Neuro   (+) Anxiety      GI   (+) Hiatal hernia      Endocrine   (+) Hyperparathyroidism due to renal insufficiency (Multi)   (+) Hypothyroidism      Musculoskeletal   (+) Osteoarthritis      ID   (+) HSV-1 (herpes simplex virus 1) infection       Clinical information reviewed:    Allergies  Meds  Problems  Med Hx             NPO Detail:  NPO/Void Status  Date of Last Liquid: 12/05/24  Time of Last Liquid: 0500  Date of Last Solid: 12/04/24  Time of Last Solid: 2100  Last Intake Type: Clear fluids         Physical Exam    Airway  Mallampati: II  TM distance: >3 FB     Cardiovascular   Rhythm: regular     Dental - normal exam     Pulmonary    Abdominal            Anesthesia Plan    History of general anesthesia?: yes  History of complications of general anesthesia?: no    ASA 2     MAC     Anesthetic plan and risks discussed with patient.    Plan discussed with attending.

## 2024-12-05 NOTE — DISCHARGE INSTRUCTIONS
DISCHARGE INSTRUCTIONS    Patient: Marielena Vides  Surgery Date: 12/5/2024    Age: 64 y.o.   Gender: female  Attending: Joyce Freeman MD    MRN: [id number]  OR Location: [facility]    PCP: [facility]           SURGERY INFORMATION   Procedure performed: Procedure(s):  Left Magseed lumpectomy   Post-Op diagnosis: Post-op Diagnosis     * Intraductal papilloma of left breast [D24.2]   Surgeon:    * Joyce Freeman - Primary     ACTIVITY RESTRICTIONS   1.  Do not engage in sports, heavy work or lifting until cleared by Dr. Freeman.    2.  Do not lift/pull/push with left arm more than 10 pounds for 2 weeks.   3.  You may drive when off of narcotic pain medication.  4.  Continue wearing the compression stockings (DOTTIE hose) until you are walking at least 3 times per day.    BATHING   You may shower starting the day after your surgery.  You may use the Hibiclens soap (the liquid soap you used prior to surgery) at your surgical sites.  However, do not use this soap more than 3 times per week.    WOUND CARE / DRAIN CARE   Remove the gauze/tape dressing before your shower.  Pat dry the Steri-Strips after your shower.  You do NOT need to cover the Steri-Strips after your shower unless there is bleeding or drainage.  2.   Allow the Steri-Strips to fall off on their own.  3.   Apply ice to your surgical area for 20 minutes 3 times a day to help with pain and swelling.  4.   Wear the pink postoperative bra, or some type of supportive undergarment, at all times (even when sleeping) for the next 2 weeks.  You may remove the bra off to shower.    MEDICATIONS   A narcotic pain medication has been prescribed.  Use this medication only AS NEEDED for severe pain.  2.   You may use Tylenol, or ibuprofen, in addition to, or instead of, your narcotic pain medication.  3.   Resume all home medications unless previously discussed with your surgeon. Blood thinners can be restarted the day after your surgery unless  there is significant bleeding.    DIET   Diet as tolerated.   For the first 24 hours after surgery, it is recommended that you eat light meals.  Some nausea and vomiting is common for the first 24 hours after surgery.  Drink plenty of fluids.  Minimize your use of caffeinated beverages.    CALLL YOUR SURGEON IF:   Any evidence of infection at your sugical site which can include redness or drainage. Some clear or pinkish drainage is normal for a few days following surgery.  2.   Excessive bleeding from your surgical site. If there is a small amount of bleeding, apply pressure for 20 minutes, then recheck the wound. If the bleeding does not stop, please call your surgeon's office.  3.   Some nausea and vomiting is expected for the first 24 hours after surgery. If you are unable to keep down fluids, or the nausea/vomiting continues beyond 24 hours.  4.   If you are unable to urinate within 8-12 hours after discharge from the hospital.  5.   A low-grade fever after surgery is normal. Notify the office if your temperature goes above 101 degrees.  6.   Any other concerns or questions you have regarding your surgery.      Joyce Freeman MD, FACS  [facility] General Surgery  [address] [place] [address];   [address]  [place] [id number] [phone]

## 2024-12-05 NOTE — ANESTHESIA PROCEDURE NOTES
Airway  Date/Time: 12/5/2024 8:17 AM  Urgency: elective    Airway not difficult    Staffing  Performed: CRNA   Authorized by: ANGEL Urrutia    Performed by: ANGEL Urrutia  Patient location during procedure: OR    Indications and Patient Condition  Indications for airway management: anesthesia  Spontaneous ventilation: present  Sedation level: deep  Preoxygenated: yes  Patient position: sniffing  Mask difficulty assessment: 0 - not attempted    Final Airway Details  Final airway type: mask         Number of attempts at approach: 1

## 2024-12-05 NOTE — ANESTHESIA POSTPROCEDURE EVALUATION
Patient: Marielena Vides    Procedure Summary       Date: 12/05/24 Room / Location: [facility] / [facility]    Anesthesia Start: 0805 Anesthesia Stop: 0925    Procedure: Left Magseed lumpectomy (Left) Diagnosis:       Intraductal papilloma of left breast      (Intraductal papilloma of left breast [D24.2])    Surgeons: Joyce Freeman MD Responsible Provider: ANGEL Urrutia    Anesthesia Type: MAC ASA Status: 2            Anesthesia Type: No value filed.    Vitals Value Taken Time   /77 12/05/24 0950   Temp 35.9 °C (96.7 °F) 12/05/24 0912   Pulse 62 12/05/24 0958   Resp 17 12/05/24 0958   SpO2 99 % 12/05/24 0958   Vitals shown include unfiled device data.    Anesthesia Post Evaluation    Patient location during evaluation: PACU  Patient participation: complete - patient participated  Level of consciousness: awake and alert  Pain score: 0  Pain management: adequate  Airway patency: patent  Cardiovascular status: hemodynamically stable  Respiratory status: room air  Hydration status: acceptable  Postoperative Nausea and Vomiting: none    No notable events documented.

## 2024-12-06 ASSESSMENT — PAIN SCALES - GENERAL: PAINLEVEL_OUTOF10: 1

## 2024-12-17 LAB
LABORATORY COMMENT REPORT: NORMAL
PATH REPORT.FINAL DX SPEC: NORMAL
PATH REPORT.GROSS SPEC: NORMAL
PATH REPORT.RELEVANT HX SPEC: NORMAL
PATH REPORT.TOTAL CANCER: NORMAL

## 2024-12-20 ENCOUNTER — APPOINTMENT (OUTPATIENT)
Dept: SURGERY | Facility: CLINIC | Age: 64
End: 2024-12-20
Payer: COMMERCIAL

## 2024-12-20 VITALS
HEART RATE: 58 BPM | BODY MASS INDEX: 26.29 KG/M2 | WEIGHT: 154 LBS | OXYGEN SATURATION: 98 % | SYSTOLIC BLOOD PRESSURE: 116 MMHG | DIASTOLIC BLOOD PRESSURE: 64 MMHG | HEIGHT: 64 IN

## 2024-12-20 DIAGNOSIS — N63.12 MASS OF UPPER INNER QUADRANT OF RIGHT BREAST: ICD-10-CM

## 2024-12-20 DIAGNOSIS — D24.2 INTRADUCTAL PAPILLOMA OF LEFT BREAST: ICD-10-CM

## 2024-12-20 DIAGNOSIS — N64.52 NIPPLE DISCHARGE: Primary | ICD-10-CM

## 2024-12-20 PROCEDURE — 1159F MED LIST DOCD IN RCRD: CPT | Performed by: SURGERY

## 2024-12-20 PROCEDURE — 99024 POSTOP FOLLOW-UP VISIT: CPT | Performed by: SURGERY

## 2024-12-20 PROCEDURE — 3078F DIAST BP <80 MM HG: CPT | Performed by: SURGERY

## 2024-12-20 PROCEDURE — 3008F BODY MASS INDEX DOCD: CPT | Performed by: SURGERY

## 2024-12-20 PROCEDURE — 3074F SYST BP LT 130 MM HG: CPT | Performed by: SURGERY

## 2024-12-20 PROCEDURE — 1160F RVW MEDS BY RX/DR IN RCRD: CPT | Performed by: SURGERY

## 2024-12-20 RX ORDER — SILVER SULFADIAZINE 10 G/1000G
CREAM TOPICAL 2 TIMES DAILY
Qty: 50 G | Refills: 3 | Status: SHIPPED | OUTPATIENT
Start: 2024-12-20
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
## 2024-12-20 NOTE — PATIENT INSTRUCTIONS
1.  Silvadene ointment has been called into your pharmacy.  Apply this to your left nipple/areola/surgical site twice a day and cover with dry gauze dressing.  You may remove the dressing to shower as normal.  2.  Follow-up in Dr. Freeman's office in 2 weeks for a wound check.  3.  Keep your appointment for your breast MRI on 4/1/2025.  This will evaluate both your left and right breast.  Follow-up in Dr. Freeman's office after this MRI.  4.  Do your monthly self breast exams.  If you identify any abnormalities, please call Dr. Freeman's office immediately.  [phone]

## 2025-01-07 ENCOUNTER — APPOINTMENT (OUTPATIENT)
Facility: CLINIC | Age: 65
End: 2025-01-07
Payer: COMMERCIAL

## 2025-03-06 ENCOUNTER — APPOINTMENT (OUTPATIENT)
Dept: OBSTETRICS AND GYNECOLOGY | Facility: CLINIC | Age: 65
End: 2025-03-06
Payer: COMMERCIAL

## 2025-03-11 DIAGNOSIS — E03.9 HYPOTHYROIDISM, UNSPECIFIED TYPE: ICD-10-CM

## 2025-03-11 RX ORDER — LEVOTHYROXINE SODIUM 125 UG/1
125 TABLET ORAL DAILY
Qty: 90 TABLET | Refills: 1 | Status: SHIPPED | OUTPATIENT
Start: 2025-03-11

## 2025-03-27 DIAGNOSIS — J30.9 ALLERGIC RHINITIS, UNSPECIFIED SEASONALITY, UNSPECIFIED TRIGGER: ICD-10-CM

## 2025-03-27 DIAGNOSIS — N39.41 URGE INCONTINENCE OF URINE: ICD-10-CM

## 2025-03-27 DIAGNOSIS — F41.9 ANXIETY: ICD-10-CM

## 2025-03-28 ENCOUNTER — PATIENT MESSAGE (OUTPATIENT)
Dept: ENDOCRINOLOGY | Facility: CLINIC | Age: 65
End: 2025-03-28
Payer: COMMERCIAL

## 2025-03-28 DIAGNOSIS — E03.9 HYPOTHYROIDISM, UNSPECIFIED TYPE: Primary | ICD-10-CM

## 2025-03-31 RX ORDER — SERTRALINE HYDROCHLORIDE 50 MG/1
50 TABLET, FILM COATED ORAL DAILY
Qty: 90 TABLET | Refills: 1 | Status: SHIPPED | OUTPATIENT
Start: 2025-03-31

## 2025-03-31 RX ORDER — MONTELUKAST SODIUM 10 MG/1
10 TABLET ORAL DAILY
Qty: 90 TABLET | Refills: 1 | Status: SHIPPED | OUTPATIENT
Start: 2025-03-31

## 2025-03-31 RX ORDER — SERTRALINE HYDROCHLORIDE 25 MG/1
25 TABLET, FILM COATED ORAL DAILY
Qty: 90 TABLET | Refills: 1 | Status: SHIPPED | OUTPATIENT
Start: 2025-03-31

## 2025-03-31 RX ORDER — MIRABEGRON 25 MG/1
25 TABLET, FILM COATED, EXTENDED RELEASE ORAL DAILY
Qty: 90 TABLET | Refills: 1 | Status: SHIPPED | OUTPATIENT
Start: 2025-03-31

## 2025-04-01 ENCOUNTER — APPOINTMENT (OUTPATIENT)
Dept: RADIOLOGY | Facility: CLINIC | Age: 65
End: 2025-04-01
Payer: MEDICARE

## 2025-04-01 LAB — TSH SERPL-ACNC: 1.15 MIU/L (ref 0.4–4.5)

## 2025-04-04 ENCOUNTER — APPOINTMENT (OUTPATIENT)
Dept: SURGERY | Facility: CLINIC | Age: 65
End: 2025-04-04
Payer: COMMERCIAL

## 2025-04-21 ENCOUNTER — HOSPITAL ENCOUNTER (OUTPATIENT)
Dept: RADIOLOGY | Facility: CLINIC | Age: 65
Discharge: HOME | End: 2025-04-21
Payer: MEDICARE

## 2025-04-21 DIAGNOSIS — N63.12 MASS OF UPPER INNER QUADRANT OF RIGHT BREAST: ICD-10-CM

## 2025-04-21 PROCEDURE — A9575 INJ GADOTERATE MEGLUMI 0.1ML: HCPCS | Mod: JZ | Performed by: SURGERY

## 2025-04-21 PROCEDURE — 2550000001 HC RX 255 CONTRASTS: Mod: JZ | Performed by: SURGERY

## 2025-04-21 PROCEDURE — 77049 MRI BREAST C-+ W/CAD BI: CPT

## 2025-04-21 PROCEDURE — 77049 MRI BREAST C-+ W/CAD BI: CPT | Performed by: STUDENT IN AN ORGANIZED HEALTH CARE EDUCATION/TRAINING PROGRAM

## 2025-04-21 RX ORDER — GADOTERATE MEGLUMINE 376.9 MG/ML
0.2 INJECTION INTRAVENOUS
Status: COMPLETED | OUTPATIENT
Start: 2025-04-21 | End: 2025-04-21

## 2025-04-21 RX ADMIN — GADOTERATE MEGLUMINE 14.5 ML: 376.9 INJECTION INTRAVENOUS at 15:26

## 2025-04-29 ENCOUNTER — APPOINTMENT (OUTPATIENT)
Facility: CLINIC | Age: 65
End: 2025-04-29
Payer: COMMERCIAL

## 2025-04-29 VITALS
WEIGHT: 167.4 LBS | OXYGEN SATURATION: 95 % | HEIGHT: 64 IN | SYSTOLIC BLOOD PRESSURE: 98 MMHG | DIASTOLIC BLOOD PRESSURE: 64 MMHG | BODY MASS INDEX: 28.58 KG/M2 | HEART RATE: 71 BPM

## 2025-04-29 DIAGNOSIS — N63.12 MASS OF UPPER INNER QUADRANT OF RIGHT BREAST: Primary | ICD-10-CM

## 2025-04-29 DIAGNOSIS — N64.52 NIPPLE DISCHARGE: ICD-10-CM

## 2025-04-29 DIAGNOSIS — Z12.31 ENCOUNTER FOR SCREENING MAMMOGRAM FOR BREAST CANCER: ICD-10-CM

## 2025-04-29 DIAGNOSIS — D24.2 INTRADUCTAL PAPILLOMA OF LEFT BREAST: ICD-10-CM

## 2025-04-29 NOTE — PATIENT INSTRUCTIONS
"1.  Do your monthly self breast exams.  If you identify any abnormalities, please call Dr. Freeman's office immediately.  [phone]  2.  The most recent breast MRI shows that the \"mass\" in your right breast is actually smaller than it was previously.  No further MRI follow-up is needed.  However, you will be due for your yearly screening mammogram of both breast in 6 months.  Follow-up in Dr. Freeman's office after this mammogram.  "

## 2025-04-29 NOTE — PROGRESS NOTES
GENERAL SURGERY OFFICE NOTE    Patient: Marielena Vides    Age: 64 y.o.   Gender: female    MRN: [id number]    PCP: [facility]        SUBJECTIVE     Chief Complaint  Follow-up (Patient is here for a 4 month breast follow up. Patient states that she has not had any problems or concerns with either breast.)       HPI  Marielena returns to the office for a 6-month follow-up  s/p left Magseed lumpectomy surgery for recurrent nipple drainage associated with an intraductal papilloma.  After the procedure, she had a small skin sloughing on the NAC.  This was treated with Silvadene dressings and had eventually healed.  She has not had any further bloody nipple drainage since the surgery.  She has no new palpable masses, skin changes or nipple discharge.  She has retired since her last office visit, and states that she is doing more work in her garden.  She had a 6-month follow-up breast MRI due to a short-term follow-up for a right breast mass.  The right breast mass appears smaller on the most recent MRI.     Risk factors for breast cancer: 64-year-old white female; menarche at age 9; first live birth at age 18; 1 breast biopsy (left) demonstrating intraductal papilloma; no family history of breast cancer. Father did have colon cancer in his mid 80s. She went through menopause at age 49; she has never used hormone replacement therapy. She did use birth control pills for about 20 to 25 years. This gives her a 5-year Zhane score of 1.4% and a lifetime risk of 6.5% which overall puts her in an slightly less than average risk category. Using the Tyrer-Cuzick Breast cancer risk evaluation tool, this patient's 10-year risk of breast cancer is 2.7% ( average risk is 3.4%) and lifetime risk is 5.6% (average lifetime risk is 7.0%). This puts her in a slightly less than average risk category for developing breast cancer.    ROS  Review of Systems   Constitutional: no fever,~no chills,~no recent weight gain~and~no  recent weight loss.   Eyes: no loss of vision,~no discharge from the eyes,~no itching of the eyes~and~no eye pain.   ENT: no hearing loss,~no neck pain~and~no hoarseness.   Cardiovascular: lower extremity edema, but~no chest pain~and~no palpitations.   Respiratory: dyspnea during exertion, but~no dyspnea~and~no cough.   Breast: nipple discharge, but~no breast mass,~no pain in breast,~no erythema,~no change in breast skin~and~no axillary adenopathy.   Gastrointestinal: no abdominal pain,~no vomiting,~bowel movement frequency normal,~no nausea.   Genitourinary: no dysuria~and~no hematuria.   Musculoskeletal: arthralgias, but~no myalgias.   Integumentary: no rashes~and~no skin lesions.   Neurological: no headache,~no dizziness,~no numbness,~no tingling~and~no limb weakness.   Psychiatric: no anxiety,~no depression~and~no emotional problems.   Endocrine: no heat or cold intolerance~and~no increased thirst.   Hematologic/Lymphatic: no tendency for easy bleeding~and~no tendency for easy bruising.   All other systems have been reviewed and are negative for complaint.     HISTORY     Past Medical History:   Diagnosis Date    Abnormal ECG 11.21    Anxiety     Arthritis 2016    Asthma     Atypical nevi 2023    Chronic kidney disease     Disease of thyroid gland 1994    Hypertension     Hypothyroidism     Intraductal papilloma of left breast 04/17/2023    Following with Dr. Freeman in general surgery, mammograms annually      Nipple discharge 04/17/2023    Obesity with body mass index 30 or greater 06/07/2024    S/P bariatric surgery 04/17/2023    Italo en Y bypass in May 2022  Has since lost 80 lbs. Doing well since surgery    Varicella 1964        Past Surgical History:   Procedure Laterality Date    BREAST BIOPSY Left     BREAST BIOPSY Left 12/05/2024    Procedure: Left Magseed lumpectomy;  Surgeon: Joyce Freeman MD;  [place] [facility] [place];  Service: General Surgery;  Laterality: Left;  8am, 1 hour, MAGSEED    BREAST  LUMPECTOMY Left 2024    Left Magseed lumpectomy (intraductal papilloma)     SECTION, LOW TRANSVERSE      GASTRIC BYPASS      OTHER SURGICAL HISTORY  2021    Appendectomy    OTHER SURGICAL HISTORY  2021    Cyst excision    OTHER SURGICAL HISTORY  2021    Tonsillectomy    OTHER SURGICAL HISTORY  2021    Oral surgery    OTHER SURGICAL HISTORY  2021     section    OTHER SURGICAL HISTORY  2021    Carpal tunnel surgery    OTHER SURGICAL HISTORY  2021    Ovarian cystectomy    OTHER SURGICAL HISTORY  2021    Elbow surgery    SKIN BIOPSY          Family History   Problem Relation Name Age of Onset    Arthritis Mother Ghada Tyler     COPD Mother Ghada Tyler     Heart disease Mother Ghada Tyler     Colon cancer Father Carl Tyler     Heart disease Father Carl Dialloton     Hyperlipidemia Father Carl Tyler     Hypertension Father Carl Dialloton     Asthma Brother Naman Dialloton     Depression Daughter Rosy Vides     Drug abuse Daughter Rosy Vides         Allergies   Allergen Reactions    Oxaprozin Unknown    Tramadol Unknown    Triamcinolone Acetonide Other    Beta-Blockers (Beta-Adrenergic Blocking Agts) Rash     Fatigue- unable to tolerate even at low dose        Social History     Tobacco Use   Smoking Status Never   Smokeless Tobacco Never        Social History     Substance and Sexual Activity   Alcohol Use Never        HOME MEDICATIONS  Current Outpatient Medications   Medication Instructions    calcium citrate (CALCITRATE) 500 mg, Daily RT    cholecalciferol (Vitamin D-3) 5,000 Units tablet 1 tablet, Daily    cyanocobalamin, vitamin B-12, (Vitamin B-12) 1,000 mcg tablet extended release 1 tablet, Daily    levothyroxine (SYNTHROID, LEVOXYL) 125 mcg, oral, Daily    mirabegron (MYRBETRIQ) 25 mg, oral, Daily    montelukast (SINGULAIR) 10 mg, oral, Daily    multivitamin tablet,chewable 2 tablets, Daily  "RT    sertraline (ZOLOFT) 50 mg, oral, Daily, Take in addition to 25mg tablet    sertraline (ZOLOFT) 25 mg, oral, Daily    valACYclovir (Valtrex) 1 gram tablet TAKE AS DIRECTED  IF NEEDED.          OBJECTIVE   Last Recorded Vitals.  Blood pressure 98/64, pulse 71, height 1.626 m (5' 4\"), weight 75.9 kg (167 lb 6.4 oz), SpO2 95%.     PHYSICAL EXAM  Physical Exam   Phone visit/previous exam:  General: Well-developed, well-nourished and in no acute distress.  Head: Normocephalic. Atraumatic.  Neck/thyroid: Neck is supple. Normal thyroid without mass. No jugular venous distention.  Eyes: Pupils equal round and reactive to light. Conjunctiva normal.  ENMT: No masses or deformity of external nose. External ears without masses.  Respiratory/Chest: Normal respiratory effort.  Breast: Moderate breasts. Fibrocystic changes of both breasts especially of the lower outer quadrants and upper outer quadrants of each breast. Symmetrical. No palpable abnormality of the right breast. No palpable abnormality of the left breast. Small red skin lesion on the upper outer quadrant of the areola without any associated cellulitis. No expressible clear nipple drainage from the left nipple.  The surgical incision just below the areola is well-healed.  There is a slightly widened scar on the inferior aspect of the NAC, but no open wound.    Lymphatics: No palpable lymphadenopathy of the cervical, supraclavicular or axillary regions.  Cardiovascular: Regular rate and rhythm.   Abdomen: Soft, nontender, nondistended.  Musculoskeletal: Joints and limbs are grossly normal. Normal gait. Normal range of motion of major joints.  Neuro: Oriented to person, place and time. No obvious neurological deficit. Motor strength grossly normal.  Psych: Normal mood and affect.     RESULTS   Labs  SURGICAL PATHOLOGY  Order: [id number]   Status: Final result       Visible to patient: Yes (not seen)    0 Result Notes   important suggestion  Newer results are " "available. Click to view them now.         Component 3 yr ago   Pathology Report Name CM HODGE                                                                                                 Accession #: [id number]            Pathologist:                   MANOLO TRIVEDI MD  Date of Procedure:    4/7/2021  Date Received:          4/7/2021  Date Reported           4/12/2021  Submitting Physician:   PRECIOUS DOUGLAS MD  Location:                      Copy To/Referring/Attending:  PRECIOUS DOUGLAS MD Other External #  MARIA ELENA NGUYEN MD                                                                   FINAL DIAGNOSIS  A.  LEFT BREAST MASS, ULTRASOUND GUIDED CORE NEEDLE BIOPSY:    -- INTRADUCTAL PAPILLOMA.                                                                                                                                                                                                                                                                                                                                                                                                                                                                                     Electronically Signed Out By MANOLO TRIVEDI MD/FIORELLA  By the signature on this report, the individual or group listed as making the  Final Interpretation/Diagnosis certifies that they have reviewed this case.           Clinical History:  LEFT NIPPLE BLOODY DISCHARGE  BREAST MASS, LEFT N63.20    Specimens Submitted As:  A: LEFT BREAST MASS    Gross Description:  Received in formalin, labeled with the patient's name and hospital number and  \"LT breast\", is an irregular/cylindrical segments of yellow-white fatty soft  tissue measuring 1.5 x 0.2 x 0.1 cm.  The specimen is submitted in toto in one  cassette.  DPG    NOTE:  Ischemia time: 11.00.  This specimen was placed into formalin at: Not provided.    dpg/4/8/2021    "           [facility]  Department of Pathology  [address] [place] [address]     CONVERTED FINAL DIAGNOSIS A.  LEFT BREAST MASS, ULTRASOUND GUIDED CORE NEEDLE BIOPSY:    -- INTRADUCTAL PAPILLOMA.          Radiology Resutls  MR breast bilateral w contrast full protocol  Status: Final result     PACS Images     Show images for MR breast bilateral w contrast full protocol  Signed by    Signed Time Phone Pager   Avani Seymour MD 4/22/2025 16:26 [phone]      Exam Information    Status Exam Begun Exam Ended   Final 4/21/2025 15:01 4/21/2025 15:32     Study Result    Narrative & Impression   Interpreted By:  Avani Seymour,   STUDY:  BI MR BREAST BILATERAL WITH CONTRAST FULL PROTOCOL;  4/21/2025 3:32 pm      ACCESSION NUMBER(S):  [id number]      ORDERING CLINICIAN:  PRECIOUS DOUGLAS      INDICATION:  Follow-up of probably benign right breast enhancing focus. Benign  left breast excisional biopsy on 12/05/2024      ,N63.12 Unspecified lump in the right breast, upper inner quadrant      COMPARISON:  Breast MRI 09/26/2024 and 03/24/2021 and all relevant prior breast  imaging exams available at the time of dictation.      TECHNIQUE:  Using a dedicated breast coil, STIR axial and T1-weighted fat  saturation axial images of the breasts were obtained, the latter both  before and after intravenous administration of Gadolinium DTPA. On an  independent workstation, 3-D images were formulated using SceneShot  including time enhancement curves, subtraction images and MIP images.      Intravenous contrast: 14.5 mL of Dotarem      FINDINGS:  Density: Scattered fibroglandular tissue.      There is symmetric minimal bilateral background enhancement.      RIGHT BREAST: Previously seen T2 hyperintense enhancing focus in  lower inner right breast at anterior depth (series 9, image 172/208)  is smaller, measuring 0.2 cm, previously 0.4 cm. This likely is  connected to the skin and  consistent with a benign skin cyst. No  suspicious mass or nonmass enhancement is identified in the right  breast.      No axillary or internal mammary lymphadenopathy is appreciated.      LEFT BREAST: Mild benign postsurgical changes in lower inner quadrant  at anterior depth. No suspicious mass or nonmass enhancement is  identified.      No axillary or internal mammary lymphadenopathy is appreciated.      NON-BREAST FINDINGS:  None.      IMPRESSION:  Previously seen enhancing focus in the right breast is smaller and  likely represents a benign skin cyst. No MRI evidence of malignancy  in either breast.      Of note, patient is due for annual bilateral screening mammography,  previous bilateral mammogram performed on 04/26/2024.      BI-RADS CATEGORY:  BI-RADS Category:  2 Benign.  Recommendation:  Annual Screening.  Recommended Date:  Immediate.  Laterality:  Bilateral.         PATHOLOGY  Surgical Pathology Exam: [id number]  Order: [id number]   Collected 12/5/2024 08:40       Status: Final result       Visible to patient: Yes (seen)       Dx: Intraductal papilloma of left breast    0 Result Notes       Component  Resulting Agency   FINAL DIAGNOSIS   A. Left breast mass, magseed localized partial mastectomy:  -- Intraductal papilloma.   -- Focal, mild pseudoangiomatous stromal hyperplasia.   -- Apocrine metaplasia and cysts.   -- Columnar cell change.   -- Fibroadenomatoid changes.   -- Patchy periductal chronic inflammation.     -- Previous biopsy site changes.       Electronically signed by Abiel Tejada MD on 12/17/2024 at 1413      [facility]            ASSESSMENT / PLAN   Diagnoses and all orders for this visit:  Mass of upper inner quadrant of right breast  Intraductal papilloma of left breast  Nipple discharge  Encounter for screening mammogram for breast cancer  -     BI mammo bilateral screening tomosynthesis; Future    Plan  1.  She had undergone a breast MRI for workup of the left nipple drainage.  The MRI  demonstrated a 3 mm intraductal mass just behind the areola. A follow-up ultrasound was able to identify this 3 mm mass. Patient subsequently underwent an ultrasound-guided biopsy of the mass with pathology diagnosis of intraductal papilloma.  She elected not to have this excised, and this was monitored every 6 months for 2 years which did not demonstrate any change in the mass.  She then returned back to the office with complaints of recurrent left nipple drainage.  Mammogram and ultrasound were negative.  The MRI was performed, and just showed a signal void where the previous biopsy clip was in the left subareolar region.  A definitive mass was not seen in the left breast.  The patient was offered a full excision of the intraductal papilloma which she declined previously.  However, with a recent increase in the amount of nipple drainage, and having a weeks worth of significant pruritus and clear nipple drainage every 2 to 3 weeks, she elected to proceed with a left Magseed lumpectomy surgery.  Since the surgery, she has not noticed any further left nipple drainage.  All wounds are well-healed.  Can go back to yearly screening mammograms.  She will be due for a left mammogram in 6 months for her yearly screening.  2.  Previous MRI suggested a new right breast mass which was probably benign, and required a 6-month MRI follow-up.  The follow-up breast MRI showed that the right breast mass is actually smaller than previous.  Appears benign.  Do not feel that this area needs to be monitored by MRI since it is actually smaller.  There is no skin lesion correlate on physical exam.  Will plan for mammogram in 6 months.  3.  Bilateral screening mammogram in 6 months.  Follow-up in the office after the mammogram.  If no further abnormalities are identified, she may go back to her yearly screening mammograms in 6 months.      Joyce Freeman MD, FACS  [facility] General Surgery  [address] [place] [address];   [address]  [place], [place]  [id number] [phone]

## 2025-06-13 ENCOUNTER — APPOINTMENT (OUTPATIENT)
Dept: PRIMARY CARE | Facility: CLINIC | Age: 65
End: 2025-06-13
Payer: COMMERCIAL

## 2025-07-05 DIAGNOSIS — B00.9 HSV-1 (HERPES SIMPLEX VIRUS 1) INFECTION: ICD-10-CM

## 2025-07-07 RX ORDER — VALACYCLOVIR HYDROCHLORIDE 1 G/1
TABLET, FILM COATED ORAL
Qty: 30 TABLET | Refills: 0 | Status: SHIPPED | OUTPATIENT
Start: 2025-07-07

## 2025-07-22 ENCOUNTER — APPOINTMENT (OUTPATIENT)
Dept: PRIMARY CARE | Facility: CLINIC | Age: 65
End: 2025-07-22
Payer: COMMERCIAL

## 2025-08-13 DIAGNOSIS — E03.9 HYPOTHYROIDISM, UNSPECIFIED TYPE: ICD-10-CM

## 2025-08-13 RX ORDER — LEVOTHYROXINE SODIUM 125 UG/1
125 TABLET ORAL
Qty: 90 TABLET | Refills: 0 | Status: SHIPPED | OUTPATIENT
Start: 2025-08-13

## 2025-09-26 ENCOUNTER — APPOINTMENT (OUTPATIENT)
Dept: ENDOCRINOLOGY | Facility: CLINIC | Age: 65
End: 2025-09-26
Payer: COMMERCIAL

## 2025-11-04 ENCOUNTER — APPOINTMENT (OUTPATIENT)
Facility: CLINIC | Age: 65
End: 2025-11-04
Payer: COMMERCIAL

## 2025-12-02 ENCOUNTER — APPOINTMENT (OUTPATIENT)
Dept: PRIMARY CARE | Facility: CLINIC | Age: 65
End: 2025-12-02
Payer: MEDICARE

## (undated) DEVICE — PROBE COVER, INTRAOPERATIVE, 13 X 244CM (5 X 96IN)

## (undated) DEVICE — GLOVE, PROTEXIS PI CLASSIC, SZ-7.0, PF, LF

## (undated) DEVICE — SUTURE, VICRYL, 2-0, 27 IN, SH, UNDYED

## (undated) DEVICE — DRAPE, SHEET, MINOR PROCEDURE, T, PEDIATRIC, 100X122X77

## (undated) DEVICE — APPLICATOR, CHLORAPREP, W/ORANGE TINT, 26ML

## (undated) DEVICE — COVER HANDLE LIGHT, STERIS, BLUE, STERILE

## (undated) DEVICE — NEEDLE, HYPODERMIC, REGULAR WALL, REGULAR BEVEL, 22 G X 1.5 IN

## (undated) DEVICE — RADIOGRAPHY DEVICE, SPECIMEN, TRANSPEC

## (undated) DEVICE — SPONGE, LAP, XRAY DECT, 18IN X 18IN, W/MASTER DMT, STERILE

## (undated) DEVICE — SUTURE, VICRYL, 4-0, 18 IN, UNDYED BR PS-2

## (undated) DEVICE — TAPE, CLOTH, HYPOALLERGENIC, MEDIPORE HI SOFT, 6 IN X 10 YD

## (undated) DEVICE — SOLUTION, IRRIGATION, STERILE WATER, 1000 ML, POUR BOTTLE

## (undated) DEVICE — CLIP, LIGATING, HORIZON, MEDIUM, TITANIUM

## (undated) DEVICE — DRESSING, GAUZE, SPONGE, VERSALON, ALL PURPOSE, 4 X 4 IN, SOFT

## (undated) DEVICE — SYRINGE, 60 CC, IRRIGATION, BULB, CONTRO-BULB, PAPER POUCH

## (undated) DEVICE — PAD, GROUNDING, ELECTROSURGICAL, W/9 FT CABLE, POLYHESIVE II, ADULT, LF

## (undated) DEVICE — Device

## (undated) DEVICE — SYRINGE, 10 CC, LUER LOCK